# Patient Record
Sex: FEMALE | Race: WHITE
[De-identification: names, ages, dates, MRNs, and addresses within clinical notes are randomized per-mention and may not be internally consistent; named-entity substitution may affect disease eponyms.]

---

## 2017-07-15 ENCOUNTER — HEALTH MAINTENANCE LETTER (OUTPATIENT)
Age: 47
End: 2017-07-15

## 2017-09-09 ENCOUNTER — APPOINTMENT (OUTPATIENT)
Dept: CT IMAGING | Facility: CLINIC | Age: 47
DRG: 418 | End: 2017-09-09
Attending: EMERGENCY MEDICINE
Payer: COMMERCIAL

## 2017-09-09 ENCOUNTER — ANESTHESIA EVENT (OUTPATIENT)
Dept: SURGERY | Facility: CLINIC | Age: 47
DRG: 418 | End: 2017-09-09
Payer: COMMERCIAL

## 2017-09-09 ENCOUNTER — HOSPITAL ENCOUNTER (INPATIENT)
Facility: CLINIC | Age: 47
LOS: 1 days | Discharge: HOME | DRG: 418 | End: 2017-09-10
Attending: EMERGENCY MEDICINE | Admitting: INTERNAL MEDICINE
Payer: COMMERCIAL

## 2017-09-09 ENCOUNTER — APPOINTMENT (OUTPATIENT)
Dept: ULTRASOUND IMAGING | Facility: CLINIC | Age: 47
DRG: 418 | End: 2017-09-09
Attending: EMERGENCY MEDICINE
Payer: COMMERCIAL

## 2017-09-09 DIAGNOSIS — I10 BENIGN ESSENTIAL HYPERTENSION: Primary | ICD-10-CM

## 2017-09-09 DIAGNOSIS — K81.0 ACUTE CHOLECYSTITIS: ICD-10-CM

## 2017-09-09 PROBLEM — K81.9 CHOLECYSTITIS: Status: ACTIVE | Noted: 2017-09-09

## 2017-09-09 LAB
ALBUMIN SERPL-MCNC: 4.2 G/DL (ref 3.4–5)
ALP SERPL-CCNC: 127 U/L (ref 40–150)
ALT SERPL W P-5'-P-CCNC: 41 U/L (ref 0–50)
ANION GAP SERPL CALCULATED.3IONS-SCNC: 11 MMOL/L (ref 3–14)
AST SERPL W P-5'-P-CCNC: 25 U/L (ref 0–45)
BILIRUB SERPL-MCNC: 0.5 MG/DL (ref 0.2–1.3)
BUN SERPL-MCNC: 10 MG/DL (ref 7–30)
CALCIUM SERPL-MCNC: 9.5 MG/DL (ref 8.5–10.1)
CHLORIDE SERPL-SCNC: 106 MMOL/L (ref 94–109)
CO2 SERPL-SCNC: 23 MMOL/L (ref 20–32)
CREAT SERPL-MCNC: 0.8 MG/DL (ref 0.52–1.04)
ERYTHROCYTE [DISTWIDTH] IN BLOOD BY AUTOMATED COUNT: 13.2 % (ref 10–15)
GFR SERPL CREATININE-BSD FRML MDRD: 76 ML/MIN/1.7M2
GLUCOSE SERPL-MCNC: 113 MG/DL (ref 70–99)
HCT VFR BLD AUTO: 41.9 % (ref 35–47)
HGB BLD-MCNC: 14.4 G/DL (ref 11.7–15.7)
LIPASE SERPL-CCNC: 97 U/L (ref 73–393)
MCH RBC QN AUTO: 29.5 PG (ref 26.5–33)
MCHC RBC AUTO-ENTMCNC: 34.4 G/DL (ref 31.5–36.5)
MCV RBC AUTO: 86 FL (ref 78–100)
PLATELET # BLD AUTO: 170 10E9/L (ref 150–450)
POTASSIUM SERPL-SCNC: 3.8 MMOL/L (ref 3.4–5.3)
PROT SERPL-MCNC: 7.4 G/DL (ref 6.8–8.8)
RBC # BLD AUTO: 4.88 10E12/L (ref 3.8–5.2)
SODIUM SERPL-SCNC: 140 MMOL/L (ref 133–144)
WBC # BLD AUTO: 11.3 10E9/L (ref 4–11)

## 2017-09-09 PROCEDURE — 74177 CT ABD & PELVIS W/CONTRAST: CPT

## 2017-09-09 PROCEDURE — 25000128 H RX IP 250 OP 636: Performed by: EMERGENCY MEDICINE

## 2017-09-09 PROCEDURE — 25000132 ZZH RX MED GY IP 250 OP 250 PS 637: Performed by: PHYSICIAN ASSISTANT

## 2017-09-09 PROCEDURE — 96365 THER/PROPH/DIAG IV INF INIT: CPT | Mod: 59

## 2017-09-09 PROCEDURE — 12000007 ZZH R&B INTERMEDIATE

## 2017-09-09 PROCEDURE — S0028 INJECTION, FAMOTIDINE, 20 MG: HCPCS | Performed by: PHYSICIAN ASSISTANT

## 2017-09-09 PROCEDURE — 80053 COMPREHEN METABOLIC PANEL: CPT | Performed by: EMERGENCY MEDICINE

## 2017-09-09 PROCEDURE — 83690 ASSAY OF LIPASE: CPT | Performed by: EMERGENCY MEDICINE

## 2017-09-09 PROCEDURE — 25000128 H RX IP 250 OP 636: Performed by: PHYSICIAN ASSISTANT

## 2017-09-09 PROCEDURE — 85027 COMPLETE CBC AUTOMATED: CPT | Performed by: EMERGENCY MEDICINE

## 2017-09-09 PROCEDURE — 96376 TX/PRO/DX INJ SAME DRUG ADON: CPT

## 2017-09-09 PROCEDURE — 25000125 ZZHC RX 250: Performed by: EMERGENCY MEDICINE

## 2017-09-09 PROCEDURE — 99223 1ST HOSP IP/OBS HIGH 75: CPT | Mod: AI | Performed by: PHYSICIAN ASSISTANT

## 2017-09-09 PROCEDURE — 25000125 ZZHC RX 250: Performed by: PHYSICIAN ASSISTANT

## 2017-09-09 PROCEDURE — 96361 HYDRATE IV INFUSION ADD-ON: CPT

## 2017-09-09 PROCEDURE — 99221 1ST HOSP IP/OBS SF/LOW 40: CPT | Mod: 57 | Performed by: SURGERY

## 2017-09-09 PROCEDURE — 99285 EMERGENCY DEPT VISIT HI MDM: CPT | Mod: 25

## 2017-09-09 PROCEDURE — 76705 ECHO EXAM OF ABDOMEN: CPT

## 2017-09-09 PROCEDURE — 96375 TX/PRO/DX INJ NEW DRUG ADDON: CPT

## 2017-09-09 PROCEDURE — 25000132 ZZH RX MED GY IP 250 OP 250 PS 637: Performed by: EMERGENCY MEDICINE

## 2017-09-09 RX ORDER — ESTRADIOL 0.05 MG/D
1 PATCH, EXTENDED RELEASE TRANSDERMAL
COMMUNITY

## 2017-09-09 RX ORDER — NALOXONE HYDROCHLORIDE 0.4 MG/ML
.1-.4 INJECTION, SOLUTION INTRAMUSCULAR; INTRAVENOUS; SUBCUTANEOUS
Status: DISCONTINUED | OUTPATIENT
Start: 2017-09-09 | End: 2017-09-10 | Stop reason: HOSPADM

## 2017-09-09 RX ORDER — POTASSIUM CHLORIDE 7.45 MG/ML
10 INJECTION INTRAVENOUS
Status: DISCONTINUED | OUTPATIENT
Start: 2017-09-09 | End: 2017-09-10 | Stop reason: HOSPADM

## 2017-09-09 RX ORDER — OXYCODONE HYDROCHLORIDE 5 MG/1
5 TABLET ORAL EVERY 4 HOURS PRN
Status: DISCONTINUED | OUTPATIENT
Start: 2017-09-09 | End: 2017-09-10

## 2017-09-09 RX ORDER — AMOXICILLIN 250 MG
1-2 CAPSULE ORAL 2 TIMES DAILY PRN
Status: DISCONTINUED | OUTPATIENT
Start: 2017-09-09 | End: 2017-09-10 | Stop reason: HOSPADM

## 2017-09-09 RX ORDER — BISACODYL 10 MG
10 SUPPOSITORY, RECTAL RECTAL DAILY PRN
Status: DISCONTINUED | OUTPATIENT
Start: 2017-09-09 | End: 2017-09-10 | Stop reason: HOSPADM

## 2017-09-09 RX ORDER — LORAZEPAM 0.5 MG/1
0.5 TABLET ORAL DAILY PRN
Status: DISCONTINUED | OUTPATIENT
Start: 2017-09-09 | End: 2017-09-10 | Stop reason: HOSPADM

## 2017-09-09 RX ORDER — LANOLIN ALCOHOL/MO/W.PET/CERES
3 CREAM (GRAM) TOPICAL
Status: DISCONTINUED | OUTPATIENT
Start: 2017-09-09 | End: 2017-09-10 | Stop reason: HOSPADM

## 2017-09-09 RX ORDER — POTASSIUM CHLORIDE 1500 MG/1
20-40 TABLET, EXTENDED RELEASE ORAL
Status: DISCONTINUED | OUTPATIENT
Start: 2017-09-09 | End: 2017-09-10 | Stop reason: HOSPADM

## 2017-09-09 RX ORDER — ERTAPENEM 1 G/1
1 INJECTION, POWDER, LYOPHILIZED, FOR SOLUTION INTRAMUSCULAR; INTRAVENOUS ONCE
Status: COMPLETED | OUTPATIENT
Start: 2017-09-09 | End: 2017-09-09

## 2017-09-09 RX ORDER — ONDANSETRON 2 MG/ML
4 INJECTION INTRAMUSCULAR; INTRAVENOUS EVERY 6 HOURS PRN
Status: DISCONTINUED | OUTPATIENT
Start: 2017-09-09 | End: 2017-09-10 | Stop reason: HOSPADM

## 2017-09-09 RX ORDER — HYDRALAZINE HYDROCHLORIDE 20 MG/ML
10 INJECTION INTRAMUSCULAR; INTRAVENOUS EVERY 4 HOURS PRN
Status: DISCONTINUED | OUTPATIENT
Start: 2017-09-09 | End: 2017-09-10 | Stop reason: HOSPADM

## 2017-09-09 RX ORDER — HYDROMORPHONE HYDROCHLORIDE 1 MG/ML
0.5 INJECTION, SOLUTION INTRAMUSCULAR; INTRAVENOUS; SUBCUTANEOUS
Status: DISCONTINUED | OUTPATIENT
Start: 2017-09-09 | End: 2017-09-09

## 2017-09-09 RX ORDER — BUPROPION HYDROCHLORIDE 200 MG/1
200 TABLET, EXTENDED RELEASE ORAL DAILY
Status: DISCONTINUED | OUTPATIENT
Start: 2017-09-09 | End: 2017-09-10 | Stop reason: HOSPADM

## 2017-09-09 RX ORDER — IOPAMIDOL 755 MG/ML
72 INJECTION, SOLUTION INTRAVASCULAR ONCE
Status: COMPLETED | OUTPATIENT
Start: 2017-09-09 | End: 2017-09-09

## 2017-09-09 RX ORDER — SODIUM CHLORIDE 9 MG/ML
INJECTION, SOLUTION INTRAVENOUS CONTINUOUS
Status: DISCONTINUED | OUTPATIENT
Start: 2017-09-09 | End: 2017-09-10

## 2017-09-09 RX ORDER — PROCHLORPERAZINE MALEATE 5 MG
5-10 TABLET ORAL EVERY 6 HOURS PRN
Status: DISCONTINUED | OUTPATIENT
Start: 2017-09-09 | End: 2017-09-10 | Stop reason: HOSPADM

## 2017-09-09 RX ORDER — HYDROMORPHONE HYDROCHLORIDE 1 MG/ML
.3-.5 INJECTION, SOLUTION INTRAMUSCULAR; INTRAVENOUS; SUBCUTANEOUS
Status: DISCONTINUED | OUTPATIENT
Start: 2017-09-09 | End: 2017-09-10

## 2017-09-09 RX ORDER — HYDROCODONE BITARTRATE AND ACETAMINOPHEN 5; 325 MG/1; MG/1
1-2 TABLET ORAL EVERY 4 HOURS PRN
Status: DISCONTINUED | OUTPATIENT
Start: 2017-09-09 | End: 2017-09-10

## 2017-09-09 RX ORDER — POTASSIUM CHLORIDE 1.5 G/1.58G
20-40 POWDER, FOR SOLUTION ORAL
Status: DISCONTINUED | OUTPATIENT
Start: 2017-09-09 | End: 2017-09-10 | Stop reason: HOSPADM

## 2017-09-09 RX ORDER — ONDANSETRON 4 MG/1
4 TABLET, ORALLY DISINTEGRATING ORAL EVERY 6 HOURS PRN
Status: DISCONTINUED | OUTPATIENT
Start: 2017-09-09 | End: 2017-09-10 | Stop reason: HOSPADM

## 2017-09-09 RX ORDER — ACETAMINOPHEN 325 MG/1
650 TABLET ORAL EVERY 4 HOURS PRN
Status: DISCONTINUED | OUTPATIENT
Start: 2017-09-09 | End: 2017-09-10 | Stop reason: HOSPADM

## 2017-09-09 RX ORDER — LIDOCAINE 40 MG/G
CREAM TOPICAL
Status: DISCONTINUED | OUTPATIENT
Start: 2017-09-09 | End: 2017-09-10 | Stop reason: HOSPADM

## 2017-09-09 RX ORDER — POTASSIUM CHLORIDE 29.8 MG/ML
20 INJECTION INTRAVENOUS
Status: DISCONTINUED | OUTPATIENT
Start: 2017-09-09 | End: 2017-09-10 | Stop reason: HOSPADM

## 2017-09-09 RX ORDER — ONDANSETRON 2 MG/ML
4 INJECTION INTRAMUSCULAR; INTRAVENOUS EVERY 30 MIN PRN
Status: DISCONTINUED | OUTPATIENT
Start: 2017-09-09 | End: 2017-09-09

## 2017-09-09 RX ORDER — ERTAPENEM 1 G/1
1 INJECTION, POWDER, LYOPHILIZED, FOR SOLUTION INTRAMUSCULAR; INTRAVENOUS EVERY 24 HOURS
Status: DISCONTINUED | OUTPATIENT
Start: 2017-09-10 | End: 2017-09-10

## 2017-09-09 RX ORDER — POLYETHYLENE GLYCOL 3350 17 G/17G
17 POWDER, FOR SOLUTION ORAL DAILY PRN
Status: DISCONTINUED | OUTPATIENT
Start: 2017-09-09 | End: 2017-09-10 | Stop reason: HOSPADM

## 2017-09-09 RX ORDER — PROCHLORPERAZINE 25 MG
25 SUPPOSITORY, RECTAL RECTAL EVERY 12 HOURS PRN
Status: DISCONTINUED | OUTPATIENT
Start: 2017-09-09 | End: 2017-09-10 | Stop reason: HOSPADM

## 2017-09-09 RX ORDER — LIDOCAINE 40 MG/G
CREAM TOPICAL
Status: DISCONTINUED | OUTPATIENT
Start: 2017-09-09 | End: 2017-09-09

## 2017-09-09 RX ORDER — POTASSIUM CL/LIDO/0.9 % NACL 10MEQ/0.1L
10 INTRAVENOUS SOLUTION, PIGGYBACK (ML) INTRAVENOUS
Status: DISCONTINUED | OUTPATIENT
Start: 2017-09-09 | End: 2017-09-10 | Stop reason: HOSPADM

## 2017-09-09 RX ORDER — SERTRALINE HYDROCHLORIDE 100 MG/1
200 TABLET, FILM COATED ORAL DAILY
Status: DISCONTINUED | OUTPATIENT
Start: 2017-09-09 | End: 2017-09-10 | Stop reason: HOSPADM

## 2017-09-09 RX ORDER — SODIUM CHLORIDE 9 MG/ML
1000 INJECTION, SOLUTION INTRAVENOUS CONTINUOUS
Status: DISCONTINUED | OUTPATIENT
Start: 2017-09-09 | End: 2017-09-09

## 2017-09-09 RX ADMIN — IOPAMIDOL 72 ML: 755 INJECTION, SOLUTION INTRAVENOUS at 12:44

## 2017-09-09 RX ADMIN — BUPROPION HYDROCHLORIDE 200 MG: 200 TABLET, EXTENDED RELEASE ORAL at 19:58

## 2017-09-09 RX ADMIN — HYDROMORPHONE HYDROCHLORIDE 0.5 MG: 1 INJECTION, SOLUTION INTRAMUSCULAR; INTRAVENOUS; SUBCUTANEOUS at 11:05

## 2017-09-09 RX ADMIN — HYDROMORPHONE HYDROCHLORIDE 0.5 MG: 1 INJECTION, SOLUTION INTRAMUSCULAR; INTRAVENOUS; SUBCUTANEOUS at 19:25

## 2017-09-09 RX ADMIN — LIDOCAINE HYDROCHLORIDE 30 ML: 20 SOLUTION ORAL; TOPICAL at 11:26

## 2017-09-09 RX ADMIN — ERTAPENEM SODIUM 1 G: 1 INJECTION, POWDER, LYOPHILIZED, FOR SOLUTION INTRAMUSCULAR; INTRAVENOUS at 14:46

## 2017-09-09 RX ADMIN — SODIUM CHLORIDE 62 ML: 9 INJECTION, SOLUTION INTRAVENOUS at 12:45

## 2017-09-09 RX ADMIN — ONDANSETRON 4 MG: 2 SOLUTION INTRAMUSCULAR; INTRAVENOUS at 11:02

## 2017-09-09 RX ADMIN — SERTRALINE HYDROCHLORIDE 200 MG: 100 TABLET ORAL at 17:14

## 2017-09-09 RX ADMIN — SODIUM CHLORIDE 1000 ML: 9 INJECTION, SOLUTION INTRAVENOUS at 10:55

## 2017-09-09 RX ADMIN — HYDROMORPHONE HYDROCHLORIDE 0.5 MG: 1 INJECTION, SOLUTION INTRAMUSCULAR; INTRAVENOUS; SUBCUTANEOUS at 12:29

## 2017-09-09 RX ADMIN — FAMOTIDINE 20 MG: 10 INJECTION, SOLUTION INTRAVENOUS at 21:44

## 2017-09-09 ASSESSMENT — ENCOUNTER SYMPTOMS
NAUSEA: 1
ABDOMINAL PAIN: 1
VOMITING: 1
BACK PAIN: 0
DIARRHEA: 0
FEVER: 0

## 2017-09-09 NOTE — ED NOTES
[facility]  ED Nurse Handoff Report    ED Chief complaint: Abdominal Pain (Upper abd pain started at 0130 today. )      ED Diagnosis:   Final diagnoses:   Acute cholecystitis       Code Status: Full Code    Allergies:   Allergies   Allergen Reactions     Penicillins        Activity level - Baseline/Home:  Independent     Activity Level - Current:   Independent     Needed?: No    Isolation: No  Infection: Not Applicable    Bariatric?: No    Vital Signs:   Vitals:    09/09/17 1200 09/09/17 1215 09/09/17 1230 09/09/17 1231   BP: (!) 155/96  (!) 146/92 (!) 146/92   Resp:    16   Temp:       TempSrc:       SpO2: 97% 97% 98% 98%       Cardiac Rhythm: ,        Pain level: 0-10 Pain Scale: 6    Is this patient confused?: No    Patient Report: Initial Complaint: Pt presents to the ED with c/o uppper abd pain. Sudden onset at 0130 today and describes it as a stabbing pain.    Focused Assessment: Pt c/o N/V with abd pain.    Tests Performed:   Results for orders placed or performed during the hospital encounter of 09/09/17 (from the past 24 hour(s))   Comprehensive metabolic panel   Result Value Ref Range    Sodium 140 133 - 144 mmol/L    Potassium 3.8 3.4 - 5.3 mmol/L    Chloride 106 94 - 109 mmol/L    Carbon Dioxide 23 20 - 32 mmol/L    Anion Gap 11 3 - 14 mmol/L    Glucose 113 (H) 70 - 99 mg/dL    Urea Nitrogen 10 7 - 30 mg/dL    Creatinine 0.80 0.52 - 1.04 mg/dL    GFR Estimate 76 >60 mL/min/1.7m2    GFR Estimate If Black >90 >60 mL/min/1.7m2    Calcium 9.5 8.5 - 10.1 mg/dL    Bilirubin Total 0.5 0.2 - 1.3 mg/dL    Albumin 4.2 3.4 - 5.0 g/dL    Protein Total 7.4 6.8 - 8.8 g/dL    Alkaline Phosphatase 127 40 - 150 U/L    ALT 41 0 - 50 U/L    AST 25 0 - 45 U/L   Lipase   Result Value Ref Range    Lipase 97 73 - 393 U/L   CBC with platelets   Result Value Ref Range    WBC 11.3 (H) 4.0 - 11.0 10e9/L    RBC Count 4.88 3.8 - 5.2 10e12/L    Hemoglobin 14.4 11.7 - 15.7 g/dL    Hematocrit 41.9 35.0 -  47.0 %    MCV 86 78 - 100 fl    MCH 29.5 26.5 - 33.0 pg    MCHC 34.4 31.5 - 36.5 g/dL    RDW 13.2 10.0 - 15.0 %    Platelet Count 170 150 - 450 10e9/L   CT Abdomen Pelvis w Contrast    Narrative    CT ABDOMEN/PELVIS WITH CONTRAST September 9, 2017 12:48 PM     HISTORY: Epigastric abdomen pain, elevated white blood cell count.    TECHNIQUE: 72 mL Isovue-370. Radiation dose for this scan was reduced  using automated exposure control, adjustment of the mA and/or kV  according to patient size, or iterative reconstruction technique.    COMPARISON: None.    FINDINGS: No evidence of diverticulitis or small bowel obstruction.  Unremarkable appendix. There is gallbladder wall thickening and mild  distention, suspicious for cholecystitis. Mild fatty liver  infiltration. On image 8, there is a left lung base nodule measuring  3.5 mm.     Recommendations for one or multiple incidental lung nodules < 6mm :    Low risk patients: No routine follow-up.    High risk patients: Optional follow-up CT at 12 months; if  unchanged, no further follow-up.    *Low Risk: Minimal or absent history of smoking or other known risk  factors.  *Nonsolid (ground glass) or partly solid nodules may require longer  follow-up to exclude indolent adenocarcinoma.  *Recommendations based on Guidelines for the Management of Incidental  Pulmonary Nodules Detected at CT: From the Fleischner Society 2017,  Radiology 2017.   Nothing else acute is seen in the upper abdominal organs.       Impression    IMPRESSION:  1. Suspected cholecystitis.  2. Small lung nodule, as above.   3. Mild fatty liver.   Abdomen US, limited (RUQ only)    Narrative    ULTRASOUND ABDOMEN LIMITED September 9, 2017 2:16 PM     HISTORY: Cholecystitis on CT? Pain, vomiting.    FINDINGS: Mild fatty liver infiltration. There are multiple  gallstones. There is wall thickening (6.7 mm). There is mild  gallbladder distention. The patient was tender while scanning over the  gallbladder. No focal  hepatic lesion or common bile duct dilatation.  The portions of the pancreas visualized are unremarkable. The right  kidney is unremarkable.        Impression    IMPRESSION:   1. Findings suggesting cholecystitis. Clinical correlation  recommended.  2. Mild fatty liver.     Treatments provided: pain mgt    Family Comments: at bedside.    OBS brochure/video discussed/provided to patient: No    ED Medications:   Medications   lidocaine 1 % 1 mL (not administered)   lidocaine (LMX4) cream ( Topical Not Given 9/9/17 1052)   sodium chloride (PF) 0.9% PF flush 3 mL (not administered)   sodium chloride (PF) 0.9% PF flush 3 mL (not administered)   0.9% sodium chloride BOLUS (0 mLs Intravenous Stopped 9/9/17 1156)     Followed by   0.9% sodium chloride infusion (not administered)   ondansetron (ZOFRAN) injection 4 mg (4 mg Intravenous Given 9/9/17 1102)   HYDROmorphone (PF) (DILAUDID) injection 0.5 mg (0.5 mg Intravenous Given 9/9/17 1229)   ertapenem (INVanz) 1 g vial to attach to  mL bag (not administered)   lidocaine (viscous) (XYLOCAINE) 2 % 15 mL, alum & mag hydroxide-simethicone (MYLANTA ES/MAALOX  ES) 15 mL GI Cocktail (30 mLs Oral Given 9/9/17 1126)   iopamidol (ISOVUE-370) solution 72 mL (72 mLs Intravenous Given 9/9/17 1244)   Saline flush (62 mLs Intravenous Given 9/9/17 1245)       Drips infusing?:  Yes      ED NURSE PHONE NUMBER: [phone]

## 2017-09-09 NOTE — H&P
PRIMARY CARE PHYSICIAN:  The patient is seen at [facility] in [place].      CHIEF COMPLAINT:  Abdominal pain.      HISTORY OF PRESENT ILLNESS:  Gabbi Grady is a 46-year-old female with a past medical history significant for major depressive disorder, essential hypertension, migraines and menopausal state who presented to [facility] Emergency Department on 09/09/2017 due to abdominal pain.      The patient was originally seen at urgent care and was sent to [facility] Emergency Department due to persistent abdominal pain.  The patient indicated that her pain began earlier this morning around 1:00, rated it a 9/10 with associated nausea and vomiting.  The patient was unable to keep any food or liquids down and could not get into a comfortable position.  She described the pain as sharp and primarily in the epigastric area in the right upper quadrant without radiation.  The patient indicated she never experienced anything like this before.      The patient was evaluated in the Emergency Department by Dr. Juarez.  Evaluation included a comprehensive metabolic panel that revealed a creatinine of 0.8 with a GFR of 76, glucose level 113, otherwise within normal limits.  Lipase level was within normal limits at 97.  CBC revealed a white count of 11.3, hemoglobin of 14.4 and platelet count of 170.  The patient underwent a right upper quadrant limited abdominal ultrasound that revealed findings suggestive of cholecystitis and mild fatty liver.  Abdomen and pelvis CT was performed with contrast that showed suspected cholecystitis, a small lung nodule and mild fatty liver, no gallstones were indicated.  The patient was given IV fluids with a full 1 liter being administered.  She also received 1 gram of IV ertapenem, a total of 1 mg of IV Dilaudid, 4 mg of IV Zofran and was then admitted to [facility] under the Hospitalist Service for continued evaluation and management.  I  evaluated the patient in the Emergency Department with her  present at bedside.  The patient again endorsed having abdominal pain that began way early in the morning which she never experienced before, sharp in nature in the epigastric and right upper quadrant area with associated nausea and vomiting.  The patient indicated that she frequently gets headaches and migraines which are sometimes accompanied with an aura.  This last occurred on Tuesday.  She also endorses bruising quite easily.  Otherwise, she is denying lightheadedness, dizziness, chest pain, palpitations, shortness of breath or dysuria.      PAST MEDICAL HISTORY:   1.  Major depressive disorder.   2.  Essential hypertension.   3.  Headaches/migraines.   4.  Menopausal state.      PAST SURGICAL HISTORY:   1.  Tonsillectomy.   2.   x3.   3.  Total laparoscopic hysterectomy with bilateral salpingo-oophorectomy.   4.  Laparoscopy x2.   5.  LEEP procedure.   6.  Cerclage x2.       PRIOR TO ADMISSION MEDICATIONS:    Prior to Admission medications    Medication Sig Last Dose Taking? Auth Provider   FLUOXETINE HCL PO Take 60 mg by mouth daily Takes every other month, alternating with Sertraline 2017 Yes Unknown, Entered By History   BuPROPion HCl (WELLBUTRIN SR PO) Take 200 mg by mouth daily 2017 at am Yes Unknown, Entered By History   aspirin-acetaminophen-caffeine (EXCEDRIN MIGRAINE) 250-250-65 MG per tablet Take 1-2 tablets by mouth every 6 hours as needed for headaches 2017 Yes Unknown, Entered By History   LORAZEPAM PO Take 0.5 mg by mouth daily as needed for anxiety 2017 Yes Unknown, Entered By History   progesterone (PROMETRIUM) 200 MG capsule Take 200 mg by mouth At Bedtime 2017 at Unknown time Yes Unknown, Entered By History   SERTRALINE HCL PO Take 200 mg by mouth daily Alternates Sertraline with Fluoxetine every other month 2017 at am Yes Unknown, Entered By History   HYDROCHLOROTHIAZIDE PO Take 25 mg by  mouth daily 9/8/2017 at Unknown time Yes Unknown, Entered By History   ZOLPIDEM TARTRATE PO Take 10 mg by mouth nightly as needed for sleep Past Week at Unknown time Yes Unknown, Entered By History   estradiol (VIVELLE-DOT) 0.05 MG/24HR BIW patch Place 1 patch onto the skin twice a week Sun/Thurs 9/7/2017 at Unknown time Yes Unknown, Entered By History           ALLERGIES:  Penicillin.      SOCIAL HISTORY:  The patient currently resides in a house in [place] with her  and 4 daughters.  She is a lifelong nontobacco user.  She endorses every other week alcohol consumption with 1-2 beverages at a time.  She denies street or illicit drug use and does not currently utilize a cane or walker.      FAMILY MEDICAL HISTORY:   1.  Father is alive but does have heart disease with noted previous heart attack and cerebrovascular accident.   2.  Mother is alive and has had ovarian cancer, renal cancer, lymphoma and thyroid disease.      REVIEW OF SYSTEMS:  A 10-point review of systems was performed.  All pertinent positives are listed in the History of Present Illness, otherwise is negative.      PHYSICAL EXAMINATION:   VITAL SIGNS:  Temperature is 98.7 degrees Fahrenheit with a blood pressure 125/77, heart rate of 87 beats per minute, respiratory rate of 18, O2 saturation of 98% on room air.  The patient is currently rating her pain a 3/10 for my assessment.    GENERAL:  The patient is awake, alert and cooperative, in no apparent distress, alert and oriented x3.   HEENT:  Normocephalic, atraumatic.  Moist mucous membranes present.  No exudates noted in the posterior pharynx.  Uvula is midline.  Eyes:  Pupils are equal, round, reactive to light.  Extraocular movements are intact.  Normal sclerae.   NECK:  Supple, normal range of motion, no tracheal deviation, no cervical lymphadenopathy present.   CARDIOVASCULAR:  Regular rate and rhythm, no rubs, murmurs or gallops appreciated.   PULMONARY:  Lungs are clear to  auscultation bilaterally, no wheezes, rhonchi or rales appreciated.   GASTROINTESTINAL:  Bowel sounds are present in all 4 quadrants, soft, nondistended.  The patient is tender to palpation in the epigastric and right upper quadrant.   NEUROLOGIC:  Cranial nerves II-XII are grossly intact.  The patient demonstrates equal sensation, coordination and strength in the upper and lower extremities bilaterally.   EXTREMITIES:  No lower extremity edema noted bilaterally.  Calves are nontender to palpation and dorsal pedal pulses are palpable bilaterally.      ASSESSMENT AND PLAN:  Gabbi Grady is a 46-year-old female with a past medical history significant for major depressive disorder, essential hypertension, headaches/migraines and menopausal state who is being admitted to [facility] on 09/09/2017 due to acute cholecystitis.   1.  Acute cholecystitis:  This was confirmed on right upper quadrant limited ultrasound and abdomen and pelvis CT.  The patient has a noted mild leukocytosis of 11.3.  The patient's abdominal pain came on abruptly, sharp in nature with associated nausea and vomiting.  At this time, patient will be made nothing by mouth.  Have formally consulted General Surgery.  The patient has received intravenous Invanz and will continue with this antibiotic on the floor.  We will continue with analgesic management including intravenous Dilaudid, oral Norco, oral Tylenol, all as needed.   2.  Essential hypertension:  The patient's blood pressures appear to be stable at this point.  The patient is compliant with prior to admission hydrochlorothiazide at 25 mg daily.  We will hold this medication while patient is nothing by mouth and initiate intravenous fluids with normal saline at 100 mL an hour.  Will have intravenous hydralazine made available as needed for systolic blood pressures greater than 180.   3.  Major depressive disorder:  This appears to be well compensated with patient's prior  to admission regimen including Wellbutrin 200 mg daily, fluoxetine 50 mg daily, sertraline 200 mg daily (patient alternates sertraline and fluoxetine every other month) and as needed Ativan 0.5 mg daily.  Will continue prior to admission sertraline, Wellbutrin and as needed Ativan.   4.  Headache/migraines:  Patient's last occurrence was last Tuesday.  We will have Tylenol made available as needed as well as further analgesics as stated above.   5.  Insomnia:  Patient currently utilizes zolpidem 10 mg nightly as needed.  Will continue this medication at this point.   6.  Menopausal state:  Patient is status post laparoscopic total hysterectomy with bilateral salpingo-oophorectomy.  She currently utilizes an Estradiol patch twice a week on Sundays and  and a nightly progesterone 200 mg capsule.  Will hold Estradiol patch and continue progesterone 200 mg nightly.   7.  Deep venous thrombosis prophylaxis:  Will place the patient on pneumatic compression devices.      CODE STATUS:  Discussed with the patient and her  and they elected to be full code.      DISPOSITION:  The patient is being admitted under inpatient status due to acute cholecystitis requiring IV antibiotics and a General Surgery consult.  I believe the patient will be hospitalized for a minimum of 2 evenings while undergoing continued antibiotics and likely surgical intervention.      The patient was discussed with Dr. Cross, who agrees with the assessment and plan as stated above.  Dr. Cross will evaluate the patient independently.         LARY CROSS MD       As dictated by ZULEYMA ANGEL PA-C            D: 2017 15:22   T: 2017 16:19   MT: LAURA      Name:     HANNAH GIPSON   MRN:      [id number]        Account:      [id number]   :      1970           Admitted:     [id number]      Document: [id number]       cc: [facility]

## 2017-09-09 NOTE — PHARMACY-ADMISSION MEDICATION HISTORY
Admission medication history interview status for the 9/9/2017  admission is complete. See EPIC admission navigator for prior to admission medications     Medication history source reliability:Good    Actions taken by pharmacist (provider contacted, etc):None     Additional medication history information not noted on PTA med list :None    Medication reconciliation/reorder completed by provider prior to medication history? No    Time spent in this activity: 20 min    Prior to Admission medications    Medication Sig Last Dose Taking? Auth Provider   FLUOXETINE HCL PO Take 60 mg by mouth daily Takes every other month, alternating with Sertraline 8/31/2017 Yes Unknown, Entered By History   BuPROPion HCl (WELLBUTRIN SR PO) Take 200 mg by mouth daily 9/8/2017 at am Yes Unknown, Entered By History   aspirin-acetaminophen-caffeine (EXCEDRIN MIGRAINE) 250-250-65 MG per tablet Take 1-2 tablets by mouth every 6 hours as needed for headaches 9/5/2017 Yes Unknown, Entered By History   LORAZEPAM PO Take 0.5 mg by mouth daily as needed for anxiety 8/26/2017 Yes Unknown, Entered By History   progesterone (PROMETRIUM) 200 MG capsule Take 200 mg by mouth At Bedtime 9/8/2017 at Unknown time Yes Unknown, Entered By History   SERTRALINE HCL PO Take 200 mg by mouth daily Alternates Sertraline with Fluoxetine every other month 9/8/2017 at am Yes Unknown, Entered By History   HYDROCHLOROTHIAZIDE PO Take 25 mg by mouth daily 9/8/2017 at Unknown time Yes Unknown, Entered By History   ZOLPIDEM TARTRATE PO Take 10 mg by mouth nightly as needed for sleep Past Week at Unknown time Yes Unknown, Entered By History   estradiol (VIVELLE-DOT) 0.05 MG/24HR BIW patch Place 1 patch onto the skin twice a week Sun/Thurs 9/7/2017 at Unknown time Yes Unknown, Entered By History

## 2017-09-09 NOTE — ED PROVIDER NOTES
History     Chief Complaint:  Abdominal Pain     HPI   Gabbi Grady is a 46 year old female who presents to the emergency department today from urgent care for evaluation of abdominal pain. The patient reports a sudden onset of stabbing epigastric abdominal pain around 0130 today. The patient states the pain is a 9/10, and was accompanied by nausea and multiple episodes of non-bloody emesis. The pain is severe enough that she is not comfortable in any position. The patient denies any alcohol use last night, or in the last few days. She has no history of pancreatitis or ulcers. The patient denies any radiation of the pain to her back, or any fever or diarrhea.     Allergies:  Penicillins      Medications:    EFFEXOR  MG OR CP24  AXERT 12.5 MG OR TABS  ZYRTEC 10 MG OR TABS    Past Medical History:    Depressive disorder   Headache   Hypertension   Menorrhagia   Urticaria    Past Surgical History:    Laparoscopy x2   section x3  Hysterectomy surgical with endometrial ablation   Trachelorrhaphy, plastic repair uterine cervix     Family History:    History reviewed. No pertinent family history.      Social History:  The patient was accompanied to the ED by her  Ric.  Smoking Status: Never smoker  Smokeless Tobacco: Never used   Alcohol Use: Yes    Marital Status:        Review of Systems   Constitutional: Negative for fever.   Gastrointestinal: Positive for abdominal pain (epigastric ), nausea and vomiting. Negative for diarrhea.   Musculoskeletal: Negative for back pain.   All other systems reviewed and are negative.    Physical Exam     Patient Vitals for the past 24 hrs:   BP Temp Temp src Heart Rate Resp SpO2   17 1231 (!) 146/92 - - 88 16 98 %   17 1230 (!) 146/92 - - - - 98 %   17 1215 - - - - - 97 %   17 1200 (!) 155/96 - - - - 97 %   17 1155 - - - - - 97 %   17 1130 (!) 155/94 - - - - 93 %   17 1126 - - - - 18 -   17 1029 (!)  153/108 98.7  F (37.1  C) Oral 90 18 100 %        Physical Exam  Nursing note and vitals reviewed.  Constitutional:  Appears well-developed and well-nourished, comfortable.   HENT:   Head:   No evidence of facial or scalp trauma.  Nose:    Nose normal.   Mouth/Throat:  Mucosa is moist.  Eyes:    Conjunctivae are normal.      Pupils are equal, round, and reactive to light.      Right eye exhibits no discharge. Left eye exhibits no discharge.      No scleral icterus.   Cardiovascular:  Normal rate, regular rhythm.      Normal heart sounds and intact distal pulses.       No murmur heard.  Pulmonary/Chest:  Effort normal and breath sounds normal. No respiratory distress.     No wheezes. No rales. No chest wall tenderness. No stridor.   Abdominal:   Soft. No distension and no mass       No flank pain.     Epigastric tenderness with palpation and worse with deep breathing. Guarding but no rebound. Negative Beavers's sign.   Musculoskeletal:  Normal range of motion.      No edema and no tenderness.                                       Neck supple, no midline cervical tenderness.   Neurological:   Alert and oriented to person, place, and year.      No cranial nerve deficit.      Exhibits normal muscle tone. Coordination normal.      GCS eye subscore is 4. GCS verbal subscore is 5.      GCS motor subscore is 6.   Skin:    Skin is warm and dry. No rash noted. No diaphoresis.      No erythema. No pallor.   Psychiatric:   Behavior is normal. Judgment and thought content normal.    Emergency Department Course     Imaging:  Radiology findings were communicated with the patient, family and Admitting MD who voiced understanding of the findings.    CT Abdomen/Pelvis w Contrast:  IMPRESSION:  1. Suspected cholecystitis.  2. Small lung nodule, as above.   Report per radiology       Abdomen US RUQ only:  IMPRESSION:   1. Findings suggesting cholecystitis. Clinical correlation  recommended.  2. Mild fatty liver.  Report per radiology       Laboratory:  Laboratory findings were communicated with the patient, family and Admitting MD who voiced understanding of the findings.    CBC: WBC 11.3 (H), HGB 14.4,    CMP: Glucose 113 (H),  o/w WNL. (Creatinine 0.80)   Lipase: 97     Interventions:  1055 NS Bolus 1,000mL IV   1102 Zofran 4mg IV   1105 Dilaudid 0.5mg IV    1126 GI Cocktail 30mg PO   1229 Dilaudid 0.5mg IV     1446 Invanz 1 g IV      Emergency Department Course:  Nursing notes and vitals reviewed.  1042 I performed an exam of the patient as documented above.   1223 Patient rechecked and updated on laboratory results and need for CT imaging   1313 Patient rechecked and updated on CT scan results.   1433 Patient rechecked and updated on ultrasound findings and need for admission.   1437 I spoke with Dr. Cross of the Hospitalist service regarding patient's presentation, findings, and plan of care.   The patient was sent for a CT abdomen/pelvis while here in the emergency department, findings above.   IV was inserted and blood was drawn for laboratory testing, results above.   The patient was sent for a Abdomen US RUQ only while in the emergency department, results above.    I discussed the treatment plan with the patient. They expressed understanding of this plan and consented to admission. I discussed the patient with Dr. Cross, who will admit the patient to a monitored bed for further evaluation and treatment. I personally reviewed the laboratory and imaging results with the Patient and spouse and answered all related questions prior to admission.   Impression & Plan      Medical Decision Making:  Patient comes in with epigastric and right upper abdominal pain. No fevers. Lab work came back showing an elevated white count of 11.3 but normal comprehensive panel and lipase. CT scan suggested acute cholecystitis. Ultrasound confirms that with a thickened gallbladder wall. No evidence of stones or obstruction. Invanz 1 g IV is ordered, I  have helped reduce her pain with Dilaudid and Zofran for nausea. Dr. Cross will be admitting the patient and she will get general surgery consultation.    Diagnosis:    ICD-10-CM    1. Acute cholecystitis K81.0      Disposition:  Admitted to third floor under the supervision of Dr. Cross. General surgery consultation.    Fredy Hoover  9/9/2017    EMERGENCY DEPARTMENT  Scribe Disclosure:  I, Fredy Hoover, am serving as a scribe at 10:42 AM on 9/9/2017 to document services personally performed by Nicky Juarez MD based on my observations and the provider's statements to me.       Nicky Juarez MD  09/09/17 1521

## 2017-09-09 NOTE — IP AVS SNAPSHOT
[address] [facility]    [address] [place] [address]    Phone:  [phone]                                       After Visit Summary   9/9/2017    Gabbi Grady    MRN: [id number]           After Visit Summary Signature Page     I have received my discharge instructions, and my questions have been answered. I have discussed any challenges I see with this plan with the nurse or doctor.    ..........................................................................................................................................  Patient/Patient Representative Signature      ..........................................................................................................................................  Patient Representative Print Name and Relationship to Patient    ..................................................               ................................................  Date                                            Time    ..........................................................................................................................................  Reviewed by Signature/Title    ...................................................              ..............................................  Date                                                            Time

## 2017-09-09 NOTE — IP AVS SNAPSHOT
MRN:[id number]                      After Visit Summary   9/9/2017    Gabbi Grady    MRN: [id number]           Thank you!     Thank you for choosing [facility] for your care. Our goal is always to provide you with excellent care. Hearing back from our patients is one way we can continue to improve our services. Please take a few minutes to complete the written survey that you may receive in the mail after you visit with us. Thank you!        Patient Information     Date Of Birth          1970        Designated Caregiver       Most Recent Value    Caregiver    Will someone help with your care after discharge? yes    Name of designated caregiver Ric    Phone number of caregiver [phone]    Caregiver address [address] [facility] [address] [facility]      About your hospital stay     You were admitted on:  September 9, 2017 You last received care in the:  [address] [facility]    You were discharged on:  September 10, 2017        Reason for your hospital stay       Laparoscopic cholecystectomy with Dr. Francis Chapa                  Who to Call     For medical emergencies, please call 911.  For non-urgent questions about your medical care, please call your primary care provider or clinic, [phone]  For questions related to your surgery, please call your surgery clinic        Attending Provider     Provider Specialty    Nicyk Juarez MD Emergency Medicine    [facility]       Primary Care Provider Office Phone # Fax #    [facility] [place] [facility] [phone]      After Care Instructions     Activity       Avoid strenuous activity or heavy lifting >20 pounds for 2-3 weeks.            Activity       Your activity upon discharge: activity as tolerated.  OK to shower.            Diet       Gradually resume your regular diet as tolerated.            Diet       Follow this diet upon discharge: Orders Placed This Encounter       Advance Diet as Tolerated: Clear Liquid Diet; Full Liquid Diet      Diet  Then diet as tolerated            Discharge Instructions       CONSTIPATION PREVENTION  We recommend that you go to a pharmacy and purchase ONE of the following stool softeners/laxatives and start taking today to prevent constipation. You can stop this bowel regimen once you are no longer taking your narcotic pain medication or are having regular bowel movements:    - Senokot oral once daily  - Milk of magnesium once daily  - Docusate Sodium 1 pill twice daily (stool softener)  - Miralax 1 scoop/packet 1-2 times daily (laxative)    In addition to the above options, if constipation continues, you can add:   - 4 oz Prune juice or Plum juice daily for constipation            Wound care and dressings       Keep dressings clean and dry. Remove the bandaids on post-op day #1 (Monday). Do not remove the small white steri strips over your incision. These will typically fall off on their own in 7-10 days. Do not attempt to replace these if they should fall off sooner. On post-op day #1, after you've removed the bandaids, you can shower normally. You can allow warm water and soap to run over the incision. Do not scrub the incision. After showering pat your skin and steri strips dry. Do not submerge or soak your incisions under water for 2 weeks.                  Follow-up Appointments     Follow-up and recommended labs and tests        Please follow-up in 2 weeks in Dr. Chapa office for your first postoperative appointment. Call [phone] to schedule.  Our clinic's name is [facility]. The address is [address] [place] [address], [address], [place], [address]            Follow-up and recommended labs and tests        Follow up with Dr. Chapa , at (location with clinic name or city) [place] office, within 2 weeks  to evaluate after surgery. No follow up labs or test are needed.                  Pending Results     No orders found for last 3 day(s).        "     Statement of Approval     Ordered          09/10/17 1030  I have reviewed and agree with all the recommendations and orders detailed in this document.  EFFECTIVE NOW     Approved and electronically signed by:  Gera Selby MD             Admission Information     Date & Time Provider Department Dept. Phone    [address] Marguerite Cross MD [place] [address] [facility] [phone]      Your Vitals Were     Blood Pressure Temperature Respirations Last Period Pulse Oximetry       111/67 98.7  F (37.1  C) 16 2005 100%       MyChart Information     [facility] lets you send messages to your doctor, view your test results, renew your prescriptions, schedule appointments and more. To sign up, go to [email or web address] . Click on \"Log in\" on the left side of the screen, which will take you to the Welcome page. Then click on \"Sign up Now\" on the right side of the page.     You will be asked to enter the access code listed below, as well as some personal information. Please follow the directions to create your username and password.     Your access code is: [email or web address]  Expires: 2017 10:35 AM     Your access code will  in 90 days. If you need help or a new code, please call your [place] clinic or [phone].        Care EveryWhere ID     This is your Care EveryWhere ID. This could be used by other organizations to access your [place] medical records  [id number]        Equal Access to Services     CALLIE MATAMOROS AH: Hadii man connollyo Soradha, waaxda luqadaha, qaybta kaalmada adeegyada, angelika shanks . So [facility] [phone].    ATENCIÓN: Si habla español, tiene a dahl disposición servicios gratuitos de asistencia lingüística. Llame al [phone].    We comply with applicable federal civil rights laws and [place] laws. We do not discriminate on the basis of race, color, national origin, age, disability sex, sexual orientation or gender " identity.               Review of your medicines      START taking        Dose / Directions    oxyCODONE 5 MG IR tablet   Commonly known as:  ROXICODONE   Used for:  Acute cholecystitis        Dose:  5-10 mg   Take 1-2 tablets (5-10 mg) by mouth every 4 hours as needed for moderate to severe pain   Quantity:  25 tablet   Refills:  0         CONTINUE these medicines which may have CHANGED, or have new prescriptions. If we are uncertain of the size of tablets/capsules you have at home, strength may be listed as something that might have changed.        Dose / Directions    hydrochlorothiazide 25 MG tablet   Commonly known as:  HYDRODIURIL   This may have changed:  medication strength   Used for:  Benign essential hypertension        Dose:  25 mg   Start taking on:  9/12/2017   Take 1 tablet (25 mg) by mouth daily   Quantity:  30 tablet   Refills:  0         CONTINUE these medicines which have NOT CHANGED        Dose / Directions    aspirin-acetaminophen-caffeine 250-250-65 MG per tablet   Commonly known as:  EXCEDRIN MIGRAINE        Dose:  1-2 tablet   Take 1-2 tablets by mouth every 6 hours as needed for headaches   Refills:  0       estradiol 0.05 MG/24HR BIW patch   Commonly known as:  VIVELLE-DOT        Dose:  1 patch   Place 1 patch onto the skin twice a week Sun/Thurs   Refills:  0       FLUOXETINE HCL PO        Dose:  60 mg   Take 60 mg by mouth daily Takes every other month, alternating with Sertraline   Refills:  0       LORAZEPAM PO        Dose:  0.5 mg   Take 0.5 mg by mouth daily as needed for anxiety   Refills:  0       progesterone 200 MG capsule   Commonly known as:  PROMETRIUM        Dose:  200 mg   Take 200 mg by mouth At Bedtime   Refills:  0       SERTRALINE HCL PO        Dose:  200 mg   Take 200 mg by mouth daily Alternates Sertraline with Fluoxetine every other month   Refills:  0       WELLBUTRIN SR PO        Dose:  200 mg   Take 200 mg by mouth daily   Refills:  0       ZOLPIDEM TARTRATE PO         Dose:  10 mg   Take 10 mg by mouth nightly as needed for sleep   Refills:  0            Where to get your medicines      Some of these will need a paper prescription and others can be bought over the counter. Ask your nurse if you have questions.     Bring a paper prescription for each of these medications     oxyCODONE 5 MG IR tablet       You don't need a prescription for these medications     hydrochlorothiazide 25 MG tablet                Protect others around you: Learn how to safely use, store and throw away your medicines at www.disposemymeds.org.             Medication List: This is a list of all your medications and when to take them. Check marks below indicate your daily home schedule. Keep this list as a reference.      Medications           Morning Afternoon Evening Bedtime As Needed    aspirin-acetaminophen-caffeine 250-250-65 MG per tablet   Commonly known as:  EXCEDRIN MIGRAINE   Take 1-2 tablets by mouth every 6 hours as needed for headaches                                estradiol 0.05 MG/24HR BIW patch   Commonly known as:  VIVELLE-DOT   Place 1 patch onto the skin twice a week Sun/Thurs                                FLUOXETINE HCL PO   Take 60 mg by mouth daily Takes every other month, alternating with Sertraline                                hydrochlorothiazide 25 MG tablet   Commonly known as:  HYDRODIURIL   Take 1 tablet (25 mg) by mouth daily   Start taking on:  9/12/2017                                LORAZEPAM PO   Take 0.5 mg by mouth daily as needed for anxiety                                oxyCODONE 5 MG IR tablet   Commonly known as:  ROXICODONE   Take 1-2 tablets (5-10 mg) by mouth every 4 hours as needed for moderate to severe pain   Last time this was given:  10 mg on 9/10/2017 12:10 PM                                progesterone 200 MG capsule   Commonly known as:  PROMETRIUM   Take 200 mg by mouth At Bedtime                                SERTRALINE HCL PO   Take 200 mg by  mouth daily Alternates Sertraline with Fluoxetine every other month   Last time this was given:  200 mg on 9/10/2017  8:42 AM                                WELLBUTRIN SR PO   Take 200 mg by mouth daily   Last time this was given:  200 mg on 9/10/2017  8:42 AM                                ZOLPIDEM TARTRATE PO   Take 10 mg by mouth nightly as needed for sleep

## 2017-09-10 ENCOUNTER — HOSPITAL ENCOUNTER (OUTPATIENT)
Facility: CLINIC | Age: 47
Setting detail: OBSERVATION
Discharge: HOME | End: 2017-09-12
Attending: EMERGENCY MEDICINE | Admitting: SURGERY
Payer: COMMERCIAL

## 2017-09-10 ENCOUNTER — APPOINTMENT (OUTPATIENT)
Dept: CT IMAGING | Facility: CLINIC | Age: 47
End: 2017-09-10
Attending: EMERGENCY MEDICINE
Payer: COMMERCIAL

## 2017-09-10 ENCOUNTER — ANESTHESIA (OUTPATIENT)
Dept: SURGERY | Facility: CLINIC | Age: 47
DRG: 418 | End: 2017-09-10
Payer: COMMERCIAL

## 2017-09-10 VITALS
OXYGEN SATURATION: 100 % | SYSTOLIC BLOOD PRESSURE: 111 MMHG | RESPIRATION RATE: 16 BRPM | TEMPERATURE: 98.7 F | DIASTOLIC BLOOD PRESSURE: 67 MMHG

## 2017-09-10 DIAGNOSIS — G89.18 POST-OP PAIN: ICD-10-CM

## 2017-09-10 DIAGNOSIS — R79.89 ELEVATED LFTS: ICD-10-CM

## 2017-09-10 DIAGNOSIS — G89.18 ACUTE POST-OPERATIVE PAIN: ICD-10-CM

## 2017-09-10 PROBLEM — K81.0 CHOLECYSTITIS, ACUTE: Status: ACTIVE | Noted: 2017-09-10

## 2017-09-10 LAB
ALBUMIN SERPL-MCNC: 4.1 G/DL (ref 3.4–5)
ALP SERPL-CCNC: 307 U/L (ref 40–150)
ALT SERPL W P-5'-P-CCNC: 332 U/L (ref 0–50)
ANION GAP SERPL CALCULATED.3IONS-SCNC: 7 MMOL/L (ref 3–14)
ANION GAP SERPL CALCULATED.3IONS-SCNC: 9 MMOL/L (ref 3–14)
AST SERPL W P-5'-P-CCNC: 387 U/L (ref 0–45)
BASOPHILS # BLD AUTO: 0 10E9/L (ref 0–0.2)
BASOPHILS # BLD AUTO: 0 10E9/L (ref 0–0.2)
BASOPHILS NFR BLD AUTO: 0.2 %
BASOPHILS NFR BLD AUTO: 0.4 %
BILIRUB SERPL-MCNC: 1.4 MG/DL (ref 0.2–1.3)
BUN SERPL-MCNC: 6 MG/DL (ref 7–30)
BUN SERPL-MCNC: 6 MG/DL (ref 7–30)
CALCIUM SERPL-MCNC: 8.4 MG/DL (ref 8.5–10.1)
CALCIUM SERPL-MCNC: 8.6 MG/DL (ref 8.5–10.1)
CHLORIDE SERPL-SCNC: 104 MMOL/L (ref 94–109)
CHLORIDE SERPL-SCNC: 108 MMOL/L (ref 94–109)
CO2 SERPL-SCNC: 25 MMOL/L (ref 20–32)
CO2 SERPL-SCNC: 27 MMOL/L (ref 20–32)
CREAT SERPL-MCNC: 0.79 MG/DL (ref 0.52–1.04)
CREAT SERPL-MCNC: 0.85 MG/DL (ref 0.52–1.04)
DIFFERENTIAL METHOD BLD: ABNORMAL
DIFFERENTIAL METHOD BLD: ABNORMAL
EOSINOPHIL # BLD AUTO: 0 10E9/L (ref 0–0.7)
EOSINOPHIL # BLD AUTO: 0.1 10E9/L (ref 0–0.7)
EOSINOPHIL NFR BLD AUTO: 0.2 %
EOSINOPHIL NFR BLD AUTO: 0.7 %
ERYTHROCYTE [DISTWIDTH] IN BLOOD BY AUTOMATED COUNT: 13.6 % (ref 10–15)
ERYTHROCYTE [DISTWIDTH] IN BLOOD BY AUTOMATED COUNT: 13.7 % (ref 10–15)
GFR SERPL CREATININE-BSD FRML MDRD: 71 ML/MIN/1.7M2
GFR SERPL CREATININE-BSD FRML MDRD: 78 ML/MIN/1.7M2
GLUCOSE SERPL-MCNC: 125 MG/DL (ref 70–99)
GLUCOSE SERPL-MCNC: 138 MG/DL (ref 70–99)
HCT VFR BLD AUTO: 38.8 % (ref 35–47)
HCT VFR BLD AUTO: 42.5 % (ref 35–47)
HGB BLD-MCNC: 13.1 G/DL (ref 11.7–15.7)
HGB BLD-MCNC: 14.3 G/DL (ref 11.7–15.7)
IMM GRANULOCYTES # BLD: 0 10E9/L (ref 0–0.4)
IMM GRANULOCYTES # BLD: 0 10E9/L (ref 0–0.4)
IMM GRANULOCYTES NFR BLD: 0.2 %
IMM GRANULOCYTES NFR BLD: 0.3 %
INTERPRETATION ECG - MUSE: NORMAL
LYMPHOCYTES # BLD AUTO: 0.7 10E9/L (ref 0.8–5.3)
LYMPHOCYTES # BLD AUTO: 1.1 10E9/L (ref 0.8–5.3)
LYMPHOCYTES NFR BLD AUTO: 10.9 %
LYMPHOCYTES NFR BLD AUTO: 8.8 %
MCH RBC QN AUTO: 29.9 PG (ref 26.5–33)
MCH RBC QN AUTO: 30 PG (ref 26.5–33)
MCHC RBC AUTO-ENTMCNC: 33.6 G/DL (ref 31.5–36.5)
MCHC RBC AUTO-ENTMCNC: 33.8 G/DL (ref 31.5–36.5)
MCV RBC AUTO: 89 FL (ref 78–100)
MCV RBC AUTO: 89 FL (ref 78–100)
MONOCYTES # BLD AUTO: 0.2 10E9/L (ref 0–1.3)
MONOCYTES # BLD AUTO: 0.6 10E9/L (ref 0–1.3)
MONOCYTES NFR BLD AUTO: 2.9 %
MONOCYTES NFR BLD AUTO: 5.7 %
NEUTROPHILS # BLD AUTO: 7.2 10E9/L (ref 1.6–8.3)
NEUTROPHILS # BLD AUTO: 8.5 10E9/L (ref 1.6–8.3)
NEUTROPHILS NFR BLD AUTO: 82.2 %
NEUTROPHILS NFR BLD AUTO: 87.5 %
NRBC # BLD AUTO: 0 10*3/UL
NRBC # BLD AUTO: 0 10*3/UL
NRBC BLD AUTO-RTO: 0 /100
NRBC BLD AUTO-RTO: 0 /100
PLATELET # BLD AUTO: 128 10E9/L (ref 150–450)
PLATELET # BLD AUTO: 153 10E9/L (ref 150–450)
POTASSIUM SERPL-SCNC: 3.2 MMOL/L (ref 3.4–5.3)
POTASSIUM SERPL-SCNC: 3.5 MMOL/L (ref 3.4–5.3)
PROT SERPL-MCNC: 7.2 G/DL (ref 6.8–8.8)
RBC # BLD AUTO: 4.38 10E12/L (ref 3.8–5.2)
RBC # BLD AUTO: 4.76 10E12/L (ref 3.8–5.2)
SODIUM SERPL-SCNC: 140 MMOL/L (ref 133–144)
SODIUM SERPL-SCNC: 140 MMOL/L (ref 133–144)
WBC # BLD AUTO: 10.3 10E9/L (ref 4–11)
WBC # BLD AUTO: 8.3 10E9/L (ref 4–11)

## 2017-09-10 PROCEDURE — 99285 EMERGENCY DEPT VISIT HI MDM: CPT | Mod: 25

## 2017-09-10 PROCEDURE — 99232 SBSQ HOSP IP/OBS MODERATE 35: CPT | Performed by: HOSPITALIST

## 2017-09-10 PROCEDURE — 0F944ZZ DRAINAGE OF GALLBLADDER, PERCUTANEOUS ENDOSCOPIC APPROACH: ICD-10-PCS | Performed by: SURGERY

## 2017-09-10 PROCEDURE — 71260 CT THORAX DX C+: CPT

## 2017-09-10 PROCEDURE — 25000128 H RX IP 250 OP 636: Performed by: NURSE ANESTHETIST, CERTIFIED REGISTERED

## 2017-09-10 PROCEDURE — 25000132 ZZH RX MED GY IP 250 OP 250 PS 637: Performed by: PHYSICIAN ASSISTANT

## 2017-09-10 PROCEDURE — 25000125 ZZHC RX 250: Performed by: NURSE ANESTHETIST, CERTIFIED REGISTERED

## 2017-09-10 PROCEDURE — 47562 LAPAROSCOPIC CHOLECYSTECTOMY: CPT | Performed by: SURGERY

## 2017-09-10 PROCEDURE — 93005 ELECTROCARDIOGRAM TRACING: CPT

## 2017-09-10 PROCEDURE — 25000128 H RX IP 250 OP 636: Performed by: EMERGENCY MEDICINE

## 2017-09-10 PROCEDURE — 36000058 ZZH SURGERY LEVEL 3 EA 15 ADDTL MIN: Performed by: SURGERY

## 2017-09-10 PROCEDURE — 85027 COMPLETE CBC AUTOMATED: CPT | Performed by: PHYSICIAN ASSISTANT

## 2017-09-10 PROCEDURE — 88304 TISSUE EXAM BY PATHOLOGIST: CPT | Performed by: SURGERY

## 2017-09-10 PROCEDURE — 25000128 H RX IP 250 OP 636: Performed by: ANESTHESIOLOGY

## 2017-09-10 PROCEDURE — 25000125 ZZHC RX 250: Performed by: EMERGENCY MEDICINE

## 2017-09-10 PROCEDURE — 40000169 ZZH STATISTIC PRE-PROCEDURE ASSESSMENT I: Performed by: SURGERY

## 2017-09-10 PROCEDURE — 25000125 ZZHC RX 250: Performed by: PHYSICIAN ASSISTANT

## 2017-09-10 PROCEDURE — 88304 TISSUE EXAM BY PATHOLOGIST: CPT | Mod: 26 | Performed by: SURGERY

## 2017-09-10 PROCEDURE — 96374 THER/PROPH/DIAG INJ IV PUSH: CPT

## 2017-09-10 PROCEDURE — 85025 COMPLETE CBC W/AUTO DIFF WBC: CPT | Performed by: PHYSICIAN ASSISTANT

## 2017-09-10 PROCEDURE — 36000056 ZZH SURGERY LEVEL 3 1ST 30 MIN: Performed by: SURGERY

## 2017-09-10 PROCEDURE — 80053 COMPREHEN METABOLIC PANEL: CPT | Performed by: EMERGENCY MEDICINE

## 2017-09-10 PROCEDURE — 74177 CT ABD & PELVIS W/CONTRAST: CPT

## 2017-09-10 PROCEDURE — S0028 INJECTION, FAMOTIDINE, 20 MG: HCPCS | Performed by: PHYSICIAN ASSISTANT

## 2017-09-10 PROCEDURE — 25000566 ZZH SEVOFLURANE, EA 15 MIN: Performed by: SURGERY

## 2017-09-10 PROCEDURE — 36415 COLL VENOUS BLD VENIPUNCTURE: CPT | Performed by: PHYSICIAN ASSISTANT

## 2017-09-10 PROCEDURE — 85025 COMPLETE CBC W/AUTO DIFF WBC: CPT | Performed by: EMERGENCY MEDICINE

## 2017-09-10 PROCEDURE — 99207 ZZC APP CREDIT; MD BILLING SHARED VISIT: CPT | Performed by: PHYSICIAN ASSISTANT

## 2017-09-10 PROCEDURE — 80048 BASIC METABOLIC PNL TOTAL CA: CPT | Performed by: PHYSICIAN ASSISTANT

## 2017-09-10 PROCEDURE — 27210794 ZZH OR GENERAL SUPPLY STERILE: Performed by: SURGERY

## 2017-09-10 PROCEDURE — 96375 TX/PRO/DX INJ NEW DRUG ADDON: CPT

## 2017-09-10 PROCEDURE — 71000012 ZZH RECOVERY PHASE 1 LEVEL 1 FIRST HR: Performed by: SURGERY

## 2017-09-10 PROCEDURE — 37000008 ZZH ANESTHESIA TECHNICAL FEE, 1ST 30 MIN: Performed by: SURGERY

## 2017-09-10 PROCEDURE — 25000128 H RX IP 250 OP 636: Performed by: PHYSICIAN ASSISTANT

## 2017-09-10 PROCEDURE — 25000132 ZZH RX MED GY IP 250 OP 250 PS 637: Performed by: INTERNAL MEDICINE

## 2017-09-10 PROCEDURE — 25000125 ZZHC RX 250: Performed by: SURGERY

## 2017-09-10 PROCEDURE — 37000009 ZZH ANESTHESIA TECHNICAL FEE, EACH ADDTL 15 MIN: Performed by: SURGERY

## 2017-09-10 PROCEDURE — 0FT44ZZ RESECTION OF GALLBLADDER, PERCUTANEOUS ENDOSCOPIC APPROACH: ICD-10-PCS | Performed by: SURGERY

## 2017-09-10 RX ORDER — PROCHLORPERAZINE MALEATE 5 MG
5-10 TABLET ORAL EVERY 6 HOURS PRN
Status: DISCONTINUED | OUTPATIENT
Start: 2017-09-10 | End: 2017-09-12 | Stop reason: HOSPADM

## 2017-09-10 RX ORDER — LIDOCAINE HYDROCHLORIDE 20 MG/ML
INJECTION, SOLUTION INFILTRATION; PERINEURAL PRN
Status: DISCONTINUED | OUTPATIENT
Start: 2017-09-10 | End: 2017-09-10

## 2017-09-10 RX ORDER — PROCHLORPERAZINE 25 MG
25 SUPPOSITORY, RECTAL RECTAL EVERY 12 HOURS PRN
Status: DISCONTINUED | OUTPATIENT
Start: 2017-09-10 | End: 2017-09-12 | Stop reason: HOSPADM

## 2017-09-10 RX ORDER — ONDANSETRON 2 MG/ML
4 INJECTION INTRAMUSCULAR; INTRAVENOUS ONCE
Status: COMPLETED | OUTPATIENT
Start: 2017-09-10 | End: 2017-09-10

## 2017-09-10 RX ORDER — HYDROCHLOROTHIAZIDE 25 MG/1
25 TABLET ORAL DAILY
Qty: 30 TABLET
Start: 2017-09-12

## 2017-09-10 RX ORDER — EPHEDRINE SULFATE 50 MG/ML
INJECTION, SOLUTION INTRAMUSCULAR; INTRAVENOUS; SUBCUTANEOUS PRN
Status: DISCONTINUED | OUTPATIENT
Start: 2017-09-10 | End: 2017-09-10

## 2017-09-10 RX ORDER — FENTANYL CITRATE 50 UG/ML
25-50 INJECTION, SOLUTION INTRAMUSCULAR; INTRAVENOUS
Status: DISCONTINUED | OUTPATIENT
Start: 2017-09-10 | End: 2017-09-10 | Stop reason: HOSPADM

## 2017-09-10 RX ORDER — OXYCODONE HYDROCHLORIDE 5 MG/1
5-10 TABLET ORAL
Status: DISCONTINUED | OUTPATIENT
Start: 2017-09-10 | End: 2017-09-12 | Stop reason: HOSPADM

## 2017-09-10 RX ORDER — HYDROMORPHONE HYDROCHLORIDE 1 MG/ML
.3-.5 INJECTION, SOLUTION INTRAMUSCULAR; INTRAVENOUS; SUBCUTANEOUS EVERY 5 MIN PRN
Status: DISCONTINUED | OUTPATIENT
Start: 2017-09-10 | End: 2017-09-10 | Stop reason: HOSPADM

## 2017-09-10 RX ORDER — HYDROMORPHONE HYDROCHLORIDE 1 MG/ML
.3-.5 INJECTION, SOLUTION INTRAMUSCULAR; INTRAVENOUS; SUBCUTANEOUS
Status: DISCONTINUED | OUTPATIENT
Start: 2017-09-10 | End: 2017-09-12 | Stop reason: HOSPADM

## 2017-09-10 RX ORDER — ONDANSETRON 4 MG/1
4 TABLET, ORALLY DISINTEGRATING ORAL EVERY 6 HOURS PRN
Status: DISCONTINUED | OUTPATIENT
Start: 2017-09-10 | End: 2017-09-12 | Stop reason: HOSPADM

## 2017-09-10 RX ORDER — SODIUM CHLORIDE, SODIUM LACTATE, POTASSIUM CHLORIDE, CALCIUM CHLORIDE 600; 310; 30; 20 MG/100ML; MG/100ML; MG/100ML; MG/100ML
INJECTION, SOLUTION INTRAVENOUS CONTINUOUS
Status: DISCONTINUED | OUTPATIENT
Start: 2017-09-10 | End: 2017-09-10 | Stop reason: HOSPADM

## 2017-09-10 RX ORDER — FENTANYL CITRATE 50 UG/ML
INJECTION, SOLUTION INTRAMUSCULAR; INTRAVENOUS PRN
Status: DISCONTINUED | OUTPATIENT
Start: 2017-09-10 | End: 2017-09-10

## 2017-09-10 RX ORDER — KETOROLAC TROMETHAMINE 30 MG/ML
INJECTION, SOLUTION INTRAMUSCULAR; INTRAVENOUS PRN
Status: DISCONTINUED | OUTPATIENT
Start: 2017-09-10 | End: 2017-09-10

## 2017-09-10 RX ORDER — SODIUM CHLORIDE AND POTASSIUM CHLORIDE 150; 900 MG/100ML; MG/100ML
INJECTION, SOLUTION INTRAVENOUS CONTINUOUS
Status: DISCONTINUED | OUTPATIENT
Start: 2017-09-10 | End: 2017-09-12 | Stop reason: CLARIF

## 2017-09-10 RX ORDER — ONDANSETRON 2 MG/ML
4 INJECTION INTRAMUSCULAR; INTRAVENOUS EVERY 30 MIN PRN
Status: DISCONTINUED | OUTPATIENT
Start: 2017-09-10 | End: 2017-09-10 | Stop reason: HOSPADM

## 2017-09-10 RX ORDER — OXYCODONE HYDROCHLORIDE 5 MG/1
5-10 TABLET ORAL EVERY 4 HOURS PRN
Status: DISCONTINUED | OUTPATIENT
Start: 2017-09-10 | End: 2017-09-10 | Stop reason: HOSPADM

## 2017-09-10 RX ORDER — PROPOFOL 10 MG/ML
INJECTION, EMULSION INTRAVENOUS PRN
Status: DISCONTINUED | OUTPATIENT
Start: 2017-09-10 | End: 2017-09-10

## 2017-09-10 RX ORDER — OXYCODONE HYDROCHLORIDE 5 MG/1
5-10 TABLET ORAL EVERY 4 HOURS PRN
Qty: 25 TABLET | Refills: 0 | Status: SHIPPED | OUTPATIENT
Start: 2017-09-10

## 2017-09-10 RX ORDER — ONDANSETRON 4 MG/1
4 TABLET, ORALLY DISINTEGRATING ORAL EVERY 30 MIN PRN
Status: DISCONTINUED | OUTPATIENT
Start: 2017-09-10 | End: 2017-09-10 | Stop reason: HOSPADM

## 2017-09-10 RX ORDER — IOPAMIDOL 755 MG/ML
86 INJECTION, SOLUTION INTRAVASCULAR ONCE
Status: COMPLETED | OUTPATIENT
Start: 2017-09-10 | End: 2017-09-10

## 2017-09-10 RX ORDER — ONDANSETRON 2 MG/ML
4 INJECTION INTRAMUSCULAR; INTRAVENOUS EVERY 6 HOURS PRN
Status: DISCONTINUED | OUTPATIENT
Start: 2017-09-10 | End: 2017-09-12 | Stop reason: HOSPADM

## 2017-09-10 RX ORDER — ONDANSETRON 2 MG/ML
INJECTION INTRAMUSCULAR; INTRAVENOUS PRN
Status: DISCONTINUED | OUTPATIENT
Start: 2017-09-10 | End: 2017-09-10

## 2017-09-10 RX ORDER — DEXAMETHASONE SODIUM PHOSPHATE 4 MG/ML
INJECTION, SOLUTION INTRA-ARTICULAR; INTRALESIONAL; INTRAMUSCULAR; INTRAVENOUS; SOFT TISSUE PRN
Status: DISCONTINUED | OUTPATIENT
Start: 2017-09-10 | End: 2017-09-10

## 2017-09-10 RX ORDER — MEPERIDINE HYDROCHLORIDE 25 MG/ML
12.5 INJECTION INTRAMUSCULAR; INTRAVENOUS; SUBCUTANEOUS EVERY 5 MIN PRN
Status: DISCONTINUED | OUTPATIENT
Start: 2017-09-10 | End: 2017-09-10 | Stop reason: HOSPADM

## 2017-09-10 RX ORDER — NALOXONE HYDROCHLORIDE 0.4 MG/ML
.1-.4 INJECTION, SOLUTION INTRAMUSCULAR; INTRAVENOUS; SUBCUTANEOUS
Status: DISCONTINUED | OUTPATIENT
Start: 2017-09-10 | End: 2017-09-12 | Stop reason: HOSPADM

## 2017-09-10 RX ORDER — BUPIVACAINE HYDROCHLORIDE AND EPINEPHRINE 2.5; 5 MG/ML; UG/ML
INJECTION, SOLUTION INFILTRATION; PERINEURAL PRN
Status: DISCONTINUED | OUTPATIENT
Start: 2017-09-10 | End: 2017-09-10 | Stop reason: HOSPADM

## 2017-09-10 RX ORDER — MORPHINE SULFATE 4 MG/ML
4 INJECTION, SOLUTION INTRAMUSCULAR; INTRAVENOUS ONCE
Status: COMPLETED | OUTPATIENT
Start: 2017-09-10 | End: 2017-09-10

## 2017-09-10 RX ADMIN — SODIUM CHLORIDE 80 ML: 9 INJECTION, SOLUTION INTRAVENOUS at 23:05

## 2017-09-10 RX ADMIN — HYDROMORPHONE HYDROCHLORIDE 0.3 MG: 1 INJECTION, SOLUTION INTRAMUSCULAR; INTRAVENOUS; SUBCUTANEOUS at 04:10

## 2017-09-10 RX ADMIN — PROPOFOL 160 MG: 10 INJECTION, EMULSION INTRAVENOUS at 01:45

## 2017-09-10 RX ADMIN — DEXAMETHASONE SODIUM PHOSPHATE 4 MG: 4 INJECTION, SOLUTION INTRA-ARTICULAR; INTRALESIONAL; INTRAMUSCULAR; INTRAVENOUS; SOFT TISSUE at 01:57

## 2017-09-10 RX ADMIN — SODIUM CHLORIDE, POTASSIUM CHLORIDE, SODIUM LACTATE AND CALCIUM CHLORIDE: 600; 310; 30; 20 INJECTION, SOLUTION INTRAVENOUS at 02:26

## 2017-09-10 RX ADMIN — SERTRALINE HYDROCHLORIDE 200 MG: 100 TABLET ORAL at 08:42

## 2017-09-10 RX ADMIN — SODIUM CHLORIDE, POTASSIUM CHLORIDE, SODIUM LACTATE AND CALCIUM CHLORIDE: 600; 310; 30; 20 INJECTION, SOLUTION INTRAVENOUS at 00:41

## 2017-09-10 RX ADMIN — FAMOTIDINE 20 MG: 10 INJECTION, SOLUTION INTRAVENOUS at 09:22

## 2017-09-10 RX ADMIN — ACETAMINOPHEN 650 MG: 325 TABLET, FILM COATED ORAL at 09:28

## 2017-09-10 RX ADMIN — MIDAZOLAM HYDROCHLORIDE 1 MG: 1 INJECTION, SOLUTION INTRAMUSCULAR; INTRAVENOUS at 01:40

## 2017-09-10 RX ADMIN — MORPHINE SULFATE 4 MG: 4 INJECTION, SOLUTION INTRAMUSCULAR; INTRAVENOUS at 21:54

## 2017-09-10 RX ADMIN — SODIUM CHLORIDE, POTASSIUM CHLORIDE, SODIUM LACTATE AND CALCIUM CHLORIDE: 600; 310; 30; 20 INJECTION, SOLUTION INTRAVENOUS at 01:10

## 2017-09-10 RX ADMIN — MIDAZOLAM HYDROCHLORIDE 1 MG: 1 INJECTION, SOLUTION INTRAMUSCULAR; INTRAVENOUS at 01:43

## 2017-09-10 RX ADMIN — ROCURONIUM BROMIDE 20 MG: 10 INJECTION INTRAVENOUS at 02:00

## 2017-09-10 RX ADMIN — Medication 5 MG: at 02:10

## 2017-09-10 RX ADMIN — BUPROPION HYDROCHLORIDE 200 MG: 200 TABLET, EXTENDED RELEASE ORAL at 08:42

## 2017-09-10 RX ADMIN — ONDANSETRON 4 MG: 2 SOLUTION INTRAMUSCULAR; INTRAVENOUS at 21:54

## 2017-09-10 RX ADMIN — SODIUM CHLORIDE: 9 INJECTION, SOLUTION INTRAVENOUS at 04:23

## 2017-09-10 RX ADMIN — FENTANYL CITRATE 100 MCG: 50 INJECTION, SOLUTION INTRAMUSCULAR; INTRAVENOUS at 01:47

## 2017-09-10 RX ADMIN — KETOROLAC TROMETHAMINE 30 MG: 30 INJECTION, SOLUTION INTRAMUSCULAR at 02:28

## 2017-09-10 RX ADMIN — OXYCODONE HYDROCHLORIDE 10 MG: 5 TABLET ORAL at 12:10

## 2017-09-10 RX ADMIN — LIDOCAINE HYDROCHLORIDE 40 MG: 20 INJECTION, SOLUTION INFILTRATION; PERINEURAL at 01:47

## 2017-09-10 RX ADMIN — OXYCODONE HYDROCHLORIDE 5 MG: 5 TABLET ORAL at 08:42

## 2017-09-10 RX ADMIN — IOPAMIDOL 86 ML: 755 INJECTION, SOLUTION INTRAVENOUS at 23:04

## 2017-09-10 RX ADMIN — ONDANSETRON 4 MG: 2 INJECTION INTRAMUSCULAR; INTRAVENOUS at 02:10

## 2017-09-10 RX ADMIN — SUCCINYLCHOLINE CHLORIDE 100 MG: 20 INJECTION, SOLUTION INTRAMUSCULAR; INTRAVENOUS at 01:48

## 2017-09-10 ASSESSMENT — ENCOUNTER SYMPTOMS
VOMITING: 0
FEVER: 0
ARTHRALGIAS: 1
ABDOMINAL PAIN: 1
NAUSEA: 0

## 2017-09-10 NOTE — ANESTHESIA POSTPROCEDURE EVALUATION
Patient: Gabbi Grady    Procedure(s):  LAPAROSCOPIC CHLOECYSTECTOMY - Wound Class: II-Clean Contaminated    Diagnosis:unknown  Diagnosis Additional Information: No value filed.    Anesthesia Type:  General, RSI, ETT    Note:  Anesthesia Post Evaluation    Patient location during evaluation: PACU  Patient participation: Able to fully participate in evaluation  Level of consciousness: awake  Pain management: adequate  Airway patency: patent  Cardiovascular status: acceptable  Respiratory status: acceptable  Hydration status: acceptable  PONV: none     Anesthetic complications: None          Last vitals:  Vitals:    09/10/17 0250 09/10/17 0300 09/10/17 0310   BP: 118/69 123/67 120/69   Resp: 22 21 14   Temp: 37.2  C (99  F) 37.3  C (99.1  F) 37.2  C (99  F)   SpO2: 98% 99% 100%         Electronically Signed By: Xiao Easton MD  September 10, 2017  3:12 AM

## 2017-09-10 NOTE — ANESTHESIA PREPROCEDURE EVALUATION
Anesthesia Evaluation     . Pt has had prior anesthetic.     History of anesthetic complications   - PONV        ROS/MED HX    ENT/Pulmonary:      (-) sleep apnea   Neurologic:     (+)migraines,     Cardiovascular:     (+) hypertension----. : . . . :. .       METS/Exercise Tolerance:  >4 METS   Hematologic:         Musculoskeletal:         GI/Hepatic:     (+) cholecystitis/cholelithiasis,      (-) GERD   Renal/Genitourinary:         Endo:      (-) Type I DM   Psychiatric:     (+) psychiatric history depression      Infectious Disease:         Malignancy:         Other:                     Physical Exam  Normal systems: dental    Airway   Mallampati: II  TM distance: >3 FB  Neck ROM: full    Dental     Cardiovascular   Rhythm and rate: regular and normal      Pulmonary    breath sounds clear to auscultation                    Anesthesia Plan      History & Physical Review  History and physical reviewed and following examination; no interval change.    ASA Status:  2 .    NPO Status:  > 6 hours    Plan for General, RSI and ETT with Intravenous induction. Maintenance will be Balanced.    PONV prophylaxis:  Ondansetron (or other 5HT-3) and Dexamethasone or Solumedrol  OGT  toradol  glidescope available        Postoperative Care      Consents  Anesthetic plan, risks, benefits and alternatives discussed with:  Patient..                          .

## 2017-09-10 NOTE — IP AVS SNAPSHOT
[place] [facility] [facility]    [address] [place] [address]    Phone:  [phone]                                       After Visit Summary   9/10/2017    Gabbi Grady    MRN: [id number]           After Visit Summary Signature Page     I have received my discharge instructions, and my questions have been answered. I have discussed any challenges I see with this plan with the nurse or doctor.    ..........................................................................................................................................  Patient/Patient Representative Signature      ..........................................................................................................................................  Patient Representative Print Name and Relationship to Patient    ..................................................               ................................................  Date                                            Time    ..........................................................................................................................................  Reviewed by Signature/Title    ...................................................              ..............................................  Date                                                            Time

## 2017-09-10 NOTE — PROGRESS NOTES
[facility]  Hospitalist Progress Note  Admission Date:  9/9/2017       Assessment and Plan:   Gabbi Grady is a 46-year-old female with a past medical history significant for major depressive disorder, essential hypertension, headaches/migraines and menopausal state who is being admitted to [facility] on 09/09/2017 due to acute cholecystitis.       #Acute cholecystitis with abdominal pain, N/V s/p laparoscopic cholecystectomy early morning of 9/10/17   #Mild Leukocytosis, WBC=11.3  -dx confirmed on right upper quadrant limited ultrasound and abdomen and pelvis CT.    PLAN:  -General Surgery consulted and patient was brought to the OR late on 9/9, underwent lap grace, findings included an intensely inflamed and edematous gallbladder, tense and full of clear mucoid fluid consistent with hydrops gallbladder, EBL was 10 cc, no complications   -on IV abx of Invanz --defer to surgery if she needs further abx/transition to oral or can we d/c?  -cont pain control with oral percocet   -advance diet as tolerated  -surgery team to see today and further outline a plan     # Essential hypertension:  The patient's blood pressures appear to be stable at this point.    PLAN:  -cont prn intravenous hydralazine  -can restart PTA HCTZ in 1-2 days     #Major depressive disorder:   PLAN:  -cont PTA Wellbutrin 200 mg daily, fluoxetine 50 mg daily, sertraline 200 mg daily and as needed Ativan 0.5 mg daily     #Headache/migraines:    PLAN:  -cont pain control as needed     # Insomnia:   PLAN:  -cont prn Ambien.     #Menopausal state:  PLAN:  -cont chronic hormonal tx with progesterone 200 mg capsule and restart Estradiol patch at home upon discharge     #FEN-  -Currently labs are unremarkable, will obtain CBC and CMP again in am  -cont IVF of NS @ 100 cc, diet as tolerated; can d/c fluids once she is tolerating PO well    -ok for oral meds     #Deep venous thrombosis prophylaxis:  Will place the  patient on pneumatic compression devices.     #Dispo-pending, and per surgery     #Code-FULL    Opal Piedra PA-C ([facility])   Hospitalist  Pager: [phone]  8:13 AM  September 10, 2017                  Interval History:   Feeling 'OK'  Still having breakthrough pain, needs another dose of oxy  No N/V  Appetite is fair  Tolerated liquids well               Medications:       buPROPion (WELLBUTRIN SR) 12 hr tablet 200 mg  200 mg Oral Daily     progesterone  200 mg Oral At Bedtime     sertraline (ZOLOFT) tablet 200 mg  200 mg Oral Daily     ertapenem (INVanz) IV  1 g Intravenous Q24H     sodium chloride (PF)  3 mL Intracatheter Q8H     famotidine  20 mg Intravenous Q12H     LORazepam (ATIVAN) tablet 0.5 mg, aspirin-acetaminophen-caffeine, naloxone, lidocaine (buffered or not buffered), lidocaine 4%, sodium chloride (PF), potassium chloride, potassium chloride, potassium chloride, potassium chloride with lidocaine, potassium chloride, acetaminophen, HYDROcodone-acetaminophen, HYDROmorphone, senna-docusate, polyethylene glycol, bisacodyl, ondansetron **OR** ondansetron, prochlorperazine **OR** prochlorperazine **OR** prochlorperazine, melatonin, hydrALAZINE, oxyCODONE, ondansetron **OR** ondansetron               Physical Exam:   Patient Vitals for the past 24 hrs:   BP Temp Temp src Heart Rate Resp SpO2   09/10/17 0450 - - - - 16 -   09/10/17 0435 110/63 97.7  F (36.5  C) Oral 86 16 97 %   09/10/17 0413 119/72 99.1  F (37.3  C) Oral 81 16 96 %   09/10/17 0410 - - - - 16 -   09/10/17 0401 111/68 98.9  F (37.2  C) - 79 16 -   09/10/17 0330 119/69 99.1  F (37.3  C) - 78 21 98 %   09/10/17 0320 119/74 99  F (37.2  C) - 78 14 99 %   09/10/17 0310 120/69 99  F (37.2  C) - 81 14 100 %   09/10/17 0300 123/67 99.1  F (37.3  C) - 80 21 99 %   09/10/17 0250 118/69 99  F (37.2  C) - 77 22 98 %   09/10/17 0241 118/77 99.7  F (37.6  C) Temporal - 14 97 %   09/10/17 0053 129/82 - - - 16 -   09/10/17 0042 - 98.5  F (36.9  C)  Temporal - - -   09/10/17 0000 - - - - 16 -   17 2330 119/74 98.4  F (36.9  C) Oral 79 16 -   175 - - - -  -   17 - - - - 16 -   17 133/77 98.3  F (36.8  C) Axillary 76 16 -   17 1445 125/77 - - 87 18 98 %   17 1231 (!) 146/92 - - 88 16 98 %   17 1230 (!) 146/92 - - - - 98 %   17 1215 - - - - - 97 %   17 1200 (!) 155/96 - - - - 97 %   17 1155 - - - - - 97 %   17 1130 (!) 155/94 - - - - 93 %   17 1126 - - - - 18 -   17 1029 (!) 153/108 98.7  F (37.1  C) Oral 90 18 100 %     Wt Readings from Last 4 Encounters:   05 64.9 kg (143 lb)   05 64.4 kg (142 lb)   04 68 kg (150 lb)   04 69.9 kg (154 lb)         Vital Sign Ranges  Temperature Temp  Av.8  F (37.1  C)  Min: 97.7  F (36.5  C)  Max: 99.7  F (37.6  C)   Blood pressure Systolic (24hrs), Av , Min:110 , Max:155        Diastolic (24hrs), Av, Min:63, Max:108      Pulse No Data Recorded   Respirations Resp  Av.8  Min: 14  Max: 22   Pulse oximetry SpO2  Av.6 %  Min: 93 %  Max: 100 %         Intake/Output Summary (Last 24 hours) at 09/10/17 0813  Last data filed at 09/10/17 0600   Gross per 24 hour   Intake             1800 ml   Output              285 ml   Net             1515 ml       Constitutional:   awake, alert, cooperative, no apparent distress, and appears stated age     Neck:   Supple, symmetrical, trachea midline, no adenopathy, thyroid symmetric, not enlarged and no tenderness, skin normal     Lungs:   No increased work of breathing, good air exchange, clear to auscultation bilaterally, no crackles or wheezing     Cardiovascular:   regular rate and rhythm, normal S1 and S2, no S3 or S4, and no murmur noted     Abdomen:   + bowel sounds, soft, non-distended, non-tender, no masses palpated, no hepatosplenomegally  Multiple incisions noted with bandages, no oozing or bleeding      Extremities  No edema, cyanosis or clubbing               Data:     Recent Labs   Lab Test  09/10/17   0612  09/09/17   1045   WBC  8.3  11.3*   HGB  13.1  14.4   MCV  89  86   PLT  128*  170      Recent Labs   Lab Test  09/10/17   0612  09/09/17   1045   NA  140  140   POTASSIUM  3.5  3.8   CHLORIDE  108  106   CO2  25  23   BUN  6*  10   CR  0.79  0.80   ANIONGAP  7  11   KYAW  8.4*  9.5   GLC  138*  113*     Recent Labs   Lab Test  09/10/17   0612  09/09/17   1045   GLC  138*  113*

## 2017-09-10 NOTE — CONSULTS
SURGICAL CONSULTATION      DATE OF CONSULTATION:  2017.      CHIEF COMPLAINT:  Right upper quadrant abdominal pain.      HISTORY OF PRESENT ILLNESS:  Gabbi Grady is a 46-year-old patient in overall good health who awoke at 0100 hours this morning with severe upper abdominal pain.  This was rated as 9/10 and associated with nausea and vomiting.  She could not get into a comfortable position.  She has never had a similar episode of pain.  She presented to the Emergency Department and was seen by Dr. Juarez from emergency staff, still having significant pain.  He gave her IV Zofran and Dilaudid.  Her pain gradually improved and by 1300 hours, had almost completely resolved, though she still had some mild tenderness with palpation in the upper abdomen.      The patient denies a history of acholic stools.  As mentioned, she has never had a similar episode of pain.  She had no prior diagnosis of cholelithiasis.  The patient has been admitted.  She has been seen by the Hospitalist Service.  We were asked to see her in surgical consultation.      She did receive 1 g of ertapenem in the Emergency Department.      ALLERGIES:  Penicillin.      PAST SURGICAL HISTORY:   1.  Tonsillectomy.   2.   x3.   3.  LEEP procedure.     4.  Cerclage x2.   5.  Laparoscopy x2.   6.  Total laparoscopic hysterectomy and bilateral salpingo-oophorectomy for benign disease.      PAST MEDICAL HISTORY:   1.  Essential hypertension with no known cardiac problems.     2.  Major depressive disorder.   3.  History of a headache and migraines.      SOCIAL HISTORY:  The patient has never smoked.  She lives with her  and 4 daughters in [place].  Several alcoholic beverages weekly.  No illicit drug use.      FAMILY MEDICAL HISTORY:  Significant for her father who had previous heart attacks and cerebrovascular accident.  Her mother had several cancers including ovarian-renal-lymphoma.      PHYSICAL EXAMINATION:   GENERAL:  The  patient was examined in her room.  Her  and children were present.  She is very comfortable at this time and denies any significant abdominal pain.  Nausea and vomiting has resolved.   VITAL SIGNS:  Blood pressure 125/77, temperature 98.7, BMI 23.80 kg/m2.   HEENT:  Unremarkable with no jaundice.   CHEST:  Clear to auscultation.   CARDIOVASCULAR:  Regular rate without murmur.   ABDOMEN:  Mildly obese, soft, minimal tenderness with palpation in the right upper quadrant.  The rest of the abdomen is completely benign.   EXTREMITIES:  No distal edema.  Good distal pulses.      ADMISSION LABORATORY DATA:  Sodium 140, potassium 3.8, serum creatinine 0.80.  Albumin 4.2, bilirubin 0.5.  Other liver function tests including lipase are normal.  Negative serum hCG.  White blood count slightly elevated at 11,300 with a normal hemoglobin of 14.4.      I reviewed the CT scan of the abdomen which was initially performed in the Emergency Department which revealed a very dilated and swollen gallbladder.  No obvious stones were noted.  The common bile duct, intrahepatic ducts were normal as was the pancreas.  The rest of the abdominal organs including the spleen, kidneys and aorta were normal; previous hysterectomy and salpingo-oophorectomy.  No obvious abdominal wall hernias.      Gallbladder ultrasound was then performed.  This did reveal multiple gallstones adherent to the wall of the gallbladder which was thickened and swollen.  The common bile duct was of normal diameter.      IMPRESSION AND PLAN:  Acute cholecystitis secondary to cholelithiasis.  Very likely she had an obstructing stone that has now pulled back from the gallbladder and that is why her pain is better.  However, with the significant episode of pain which she had, in fact, the gallbladder was very swollen, at this point in time I feel that she would benefit from a cholecystectomy.  She has been given intravenous antibiotics and is now on IV fluids.  She  will be taken to the operating room tomorrow for laparoscopic cholecystectomy by my associate Dr. Francis Love.  I discussed the operative procedure at length.  There is a chance that an open cholecystectomy may be necessary, but I feel it is very unlikely since she has had only 1 episode of this problem and thus inflammation should be only mild.  She is also aware that there is a potential injury to the common bile duct, but again this would be quite unusual.  We discussed the postoperative recovery and return to normal activities.  She is aware that if she does eat very fatty food, post-procedure she has a good chance of developing diarrhea but typically people with gallbladders out eat a relatively normal diet and live a completely normal life.      I spent 30 minutes with the patient and her family today, with over 50% in counseling.  We plan to take her to the operating room tomorrow but we are on a weekend schedule and possibly that this may be delayed depending on the number of emergencies that occur.         JASON KELLY MD             D: 2017 18:15   T: 2017 19:55   MT:       Name:     HANNAH GIPSON   MRN:      [id number]        Account:       [id number]   :      1970           Consult Date:  2017      Document: [id number]

## 2017-09-10 NOTE — OP NOTE
General Surgery Operative Note    PREOPERATIVE DIAGNOSIS:  Acute cholecystitis    POSTOPERATIVE DIAGNOSIS:  Acute cholecystitis with hydrops gallbladder    PROCEDURE:   Procedure(s):  LAPAROSCOPIC CHOLECYSTECTOMY    ANESTHESIA:  General.    PREOPERATIVE MEDICATIONS:  Invanz IV.    SURGEON:  Francis Chapa MD    ASSISTANT:  NONE    INDICATIONS:  Patient presented yesterday with severe abdominal pain which had awoken her from sleep.  No previous episodes like this.  Was found to have a mild leukocytosis and a CT scan and ultrasound confirmed findings suggestive of acute cholecystitis.    PROCEDURE:  The patient was taken to the operating suite and uneventfully endotracheally intubated.  The abdomen was prepped and draped in a sterile fashion.  Surgeon initiated timeout was acknowledged.  We entered the abdomen in the left upper quadrant using Visiport technique.  Three other trocars were placed under laparoscopic visualization.  We elevated the liver and were able to identify a very inflamed and edematous gallbladder.  We used an aspiration needle to puncture the dome of the gallbladder and removed 60 cc of thick, clear mucoid fluid consistent with hydrops gallbladder.  The gallbladder was grasped and used to elevate the liver further.  We began dissecting out some fatty adhesions down near the neck of the gallbladder until a cystic duct was encountered.  We continued our dissection using combination of sharp and blunt dissection until the cystic duct was largely dissected out.  We continued our dissection up along the sides of the gallbladder, both medially and laterally, until we had created a space between the gallbladder and the liver.  At this point, we encountered the cystic artery, just posterior and lateral to the cystic duct.  This again was dissected out.  Once we had created a window where only the cystic artery and duct were noted to be entering the gallbladder, we felt that this represented our critical  view.  The cystic artery and duct were then doubly clipped and divided.  We continued our dissection up along the body of the gallbladder, freeing all attachments and adhesions of the gallbladder to the liver.  Gallbladder was removed from the liver in an atraumatic fashion.  The gallbladder was then brought up through the umbilical port site and removed from the abdomen.  The gallbladder fossa was reinspected, and all areas of bleeding were managed with electrocautery.  We irrigated the area with normal saline and aspirated it out.  We then removed the umbilical port trocar and closed the fascia with a figure-of-eight 0 Vicryl suture.  This was done using the Addison-Annelise device.  We then reinspected the abdomen, and everything appeared to be in pristine condition.  We removed the trocars under laparoscopic visualization and desufflated the abdomen with the Isreal suction .  The skin edges were reapproximated with 4-0 Vicryl and Steri-Strips.  The patient was uneventfully extubated, awakened and taken to the PACU in stable condition.  At the conclusion of the case, all lap and needle counts were correct.      ESTIMATED BLOOD LOSS:  10 mL    INTRAOPERATIVE FINDINGS:  Intensely inflamed and edematous gallbladder.  Tense and full of clear mucoid fluid consistent with hydrops gallbladder.    Francis Chpaa MD, MD

## 2017-09-10 NOTE — PLAN OF CARE
Problem: Goal Outcome Summary  Goal: Goal Outcome Summary  Outcome: Improving  DOS. A&Ox4. Up with SBA/A1. Tolerating clears. Pain controlled with IV dilaudid. Voiding in BSC. IV infusing. VSS on RA. CMS baseline. Dressings on abdomen CDI. Abdomen tender, mildly distended, active bowel sounds. Plan for possible DC home today. Wedding ring in patients bag of belongings per her request. Continue to monitor.

## 2017-09-10 NOTE — DISCHARGE SUMMARY
DATE OF ADMISSION:  2017      DATE OF DISCHARGE:  09/10/2017      HISTORY OF PRESENT ILLNESS:  Hannah Gipson is a 46-year-old patient who developed acute upper abdominal pain.  She was admitted to [facility].  Ultrasound revealed evidence of acute cholecystitis with cholelithiasis.      HOSPITAL COURSE:  The patient was admitted.  She was given IV hydration and intravenous narcotics which did improve her discomfort.  She was noted to have a markedly distended gallbladder on CT scan and ultrasound with multiple stones.  It was felt that she would be best treated with surgery.      Dr. Chapa performed a laparoscopic cholecystectomy early in the morning of 09/10/2017.  He found hydrops of the gallbladder inflammation with acute inflammation.  No other pathology was appreciated.  She tolerated the procedure quite well.      Postoperatively, the patient had significant improvement of her discomfort.  She was ready for discharge to home later on the first postoperative day with no evidence of complications.      FINAL DIAGNOSES:   1.  Acute cholecystitis with hydrops secondary to cholelithiasis, status post laparoscopic cholecystectomy.   2.  Hypertension.      DISCHARGE MEDICATIONS:   1.  Hydrochlorothiazide.   2.  Oxycodone p.r.n.   3.  Ibuprofen p.r.n.      PLAN:  The patient will discharge to home.  She will gradually increase her diet and activities.  She will follow up in our office in 2 weeks.         JASON KELLY MD             D: 09/10/2017 10:33   T: 09/10/2017 11:09   MT: LAURA      Name:     HANNAH GIPSON   MRN:      [id number]        Account:        [id number]   :      1970           Admit Date:                                       Discharge Date:       Document: [id number]       cc: [facility]

## 2017-09-10 NOTE — ANESTHESIA CARE TRANSFER NOTE
Patient: Gabbi Grady    Procedure(s):  LAPAROSCOPIC CHLOECYSTECTOMY - Wound Class: II-Clean Contaminated    Diagnosis: unknown  Diagnosis Additional Information: No value filed.    Anesthesia Type:   General, RSI, ETT     Note:  Airway :Face Mask  Patient transferred to:PACU  Comments: Neuromuscular blockade reversed after TOF 4/4, spontaneous respirations, adequate tidal volumes, followed commands to voice, oropharynx suctioned with soft flexible catheter, extubated atraumatically, extubated with suction, airway patent after extubation.  Oxygen via facemask at 8 liters per minute to PACU. Oxygen tubing connected to wall O2 in PACU, SpO2, NiBP, and EKG monitors and alarms on and functioning, Rivas Hugger warmer connected to patient gown, report on patient's clinical status given to PACU RN, RN questions answered.       Vitals: (Last set prior to Anesthesia Care Transfer)    CRNA VITALS  9/10/2017 0210 - 9/10/2017 0244      9/10/2017             Pulse: 91    SpO2: 93 %    Resp Rate (set): 10                Electronically Signed By: CARMELO Rico CRNA  September 10, 2017  2:44 AM

## 2017-09-10 NOTE — PLAN OF CARE
Problem: Goal Outcome Summary  Goal: Goal Outcome Summary  Outcome: No Change  Md here to see patient. Will do surgery tonight instead of tomorrow. Pre-op checklist done. NPO since 2100. Patient denies any questions or concerns at this time. Consent signed. Ready for surgery.

## 2017-09-10 NOTE — PROGRESS NOTES
Surgery Progress Note    S:  Comfortable.    O:   Vitals:  BP  Min: 110/63  Max: 155/96  Temp  Av.8  F (37.1  C)  Min: 97.7  F (36.5  C)  Max: 99.7  F (37.6  C)  I/O last 3 completed shifts:  In: 1800 [P.O.:200; I.V.:1600]  Out: 285 [Urine:275; Blood:10]    Physical Exam: Alert   Talkative   VSS                             Wd=A      Assessment/Plan: Doing well.   Home today.   RTO 2 weeks      Wm. Sola MD      Discharge Summary dictated.

## 2017-09-10 NOTE — IP AVS SNAPSHOT
MRN:[id number]                      After Visit Summary   9/10/2017    Gabbi Grady    MRN: [id number]           Thank you!     Thank you for choosing [facility] for your care. Our goal is always to provide you with excellent care. Hearing back from our patients is one way we can continue to improve our services. Please take a few minutes to complete the written survey that you may receive in the mail after you visit with us. Thank you!        Patient Information     Date Of Birth          1970        Designated Caregiver       Most Recent Value    Caregiver    Will someone help with your care after discharge? yes    Name of designated caregiver elizabeth    Phone number of caregiver [phone]    [address]iver [address]      About your hospital stay     You were admitted on:  September 10, 2017 You last received care in the:  [place] [address] [facility]    You were discharged on:  September 12, 2017        Reason for your hospital stay       Recheck after laparoscopic cholecystectomy with Dr. Chapa                  Who to Call     For medical emergencies, please call 911.  For non-urgent questions about your medical care, please call your primary care provider or clinic, [phone]          Attending Provider     Provider Specialty    Armani Torres MD --    Gera Selby MD Surgery    Sammi, Francis Galicia MD Surgery       Primary Care Provider Office Phone # Fax #    [facility] [phone]      After Care Instructions     Activity       Avoid strenuous activity or heavy lifting >20 pounds for 2-3 weeks.            Diet       Gradually resume your regular diet as tolerated.            Discharge Instructions       CONSTIPATION PREVENTION  We recommend that you go to a pharmacy and purchase ONE of the following stool softeners/laxatives and start taking today to prevent constipation. You can  stop this bowel regimen once you are no longer taking your narcotic pain medication or are having regular bowel movements:    - Senokot oral once daily  - Milk of magnesium once daily  - Docusate Sodium 1 pill twice daily (stool softener)  - Miralax 1 scoop/packet 1-2 times daily (laxative)    In addition to the above options, if constipation continues, you can add:   - 4 oz Prune juice or Plum juice daily for constipation            Wound care and dressings       Keep dressings clean and dry. Remove bandaids. Do not remove the small white steri strips over your incision. These will typically fall off on their own in 7-10 days. Do not attempt to replace these if they should fall off sooner. You can shower normally. You can allow warm water and soap to run over the incision. Do not scrub the incision. After showering pat your skin and steri strips dry. Do not submerge or soak your incisions under water for 2 weeks.                  Follow-up Appointments     Follow-up and recommended labs and tests        Please follow-up in 2 weeks in Dr. Chapa's office for your first postoperative appointment. Call [phone] to schedule.  Our clinic's name is [facility]. The address is [address] [place] [address] [address], [place], [place], [address]                  Pending Results     Date and Time Order Name Status Description    9/10/2017 0220 Surgical pathology exam In process             Statement of Approval     Ordered          09/12/17 0828  I have reviewed and agree with all the recommendations and orders detailed in this document.  EFFECTIVE NOW     Approved and electronically signed by:  Marisela Roach PA-C             Admission Information     Date & Time Provider Department Dept. Phone    9/10/2017 Francis Chapa MD [place] [address] [facility] [phone]      Your Vitals Were     Blood Pressure Pulse Temperature Respirations Height Weight    136/86 (BP Location: Right arm) 80 97.6  F  "(36.4  C) (Oral) 16 1.651 m (5' 5\") 78 kg (172 lb)    Last Period Pulse Oximetry BMI (Body Mass Index)             2005 99% 28.62 kg/m2         [employer] Information     [employer] lets you send messages to your doctor, view your test results, renew your prescriptions, schedule appointments and more. To sign up, go to [email or web address] . Click on \"Log in\" on the left side of the screen, which will take you to the Welcome page. Then click on \"Sign up Now\" on the right side of the page.     You will be asked to enter the access code listed below, as well as some personal information. Please follow the directions to create your username and password.     Your access code is: [email or web address]  Expires: 2017 10:35 AM     Your access code will  in 90 days. If you need help or a new code, please call your [facility] clinic or [phone].        Care EveryWhere ID     This is your Care EveryWhere ID. This could be used by other organizations to access your [facility] medical records  [id number]        Equal Access to Services     CALLIE MATAMOROS AH: Hadii man Zhao, waaxda lucornelio, qaybta kaalmada adekaleb, angelika vargas. So [facility] [phone].    ATENCIÓN: Si habla español, tiene a dahl disposición servicios gratuitos de asistencia lingüística. Brennon al [phone].    We comply with applicable federal civil rights laws and [place] laws. We do not discriminate on the basis of race, color, national origin, age, disability sex, sexual orientation or gender identity.               Review of your medicines      CONTINUE these medicines which have NOT CHANGED        Dose / Directions    aspirin-acetaminophen-caffeine 250-250-65 MG per tablet   Commonly known as:  EXCEDRIN MIGRAINE        Dose:  1-2 tablet   Take 1-2 tablets by mouth every 6 hours as needed for headaches   Refills:  0       estradiol 0.05 MG/24HR BIW patch   Commonly known as:  VIVELLE-DOT        Dose:  1 " patch   Place 1 patch onto the skin twice a week Sun/Thurs   Refills:  0       FLUOXETINE HCL PO        Dose:  60 mg   Take 60 mg by mouth daily Takes every other month, alternating with Sertraline   Refills:  0       hydrochlorothiazide 25 MG tablet   Commonly known as:  HYDRODIURIL   Used for:  Benign essential hypertension        Dose:  25 mg   Take 1 tablet (25 mg) by mouth daily   Quantity:  30 tablet   Refills:  0       LORAZEPAM PO        Dose:  0.5 mg   Take 0.5 mg by mouth daily as needed for anxiety   Refills:  0       oxyCODONE 5 MG IR tablet   Commonly known as:  ROXICODONE   Used for:  Acute cholecystitis        Dose:  5-10 mg   Take 1-2 tablets (5-10 mg) by mouth every 4 hours as needed for moderate to severe pain   Quantity:  25 tablet   Refills:  0       progesterone 200 MG capsule   Commonly known as:  PROMETRIUM        Dose:  200 mg   Take 200 mg by mouth At Bedtime   Refills:  0       SERTRALINE HCL PO        Dose:  200 mg   Take 200 mg by mouth daily Alternates Sertraline with Fluoxetine every other month   Refills:  0       WELLBUTRIN SR PO        Dose:  200 mg   Take 200 mg by mouth daily   Refills:  0       ZOLPIDEM TARTRATE PO        Dose:  10 mg   Take 10 mg by mouth nightly as needed for sleep   Refills:  0                Protect others around you: Learn how to safely use, store and throw away your medicines at www.disposemymeds.org.             Medication List: This is a list of all your medications and when to take them. Check marks below indicate your daily home schedule. Keep this list as a reference.      Medications           Morning Afternoon Evening Bedtime As Needed    aspirin-acetaminophen-caffeine 250-250-65 MG per tablet   Commonly known as:  EXCEDRIN MIGRAINE   Take 1-2 tablets by mouth every 6 hours as needed for headaches                                   estradiol 0.05 MG/24HR BIW patch   Commonly known as:  VIVELLE-DOT   Place 1 patch onto the skin twice a week Sun/Thurs    Next Dose Due:  Resume your home schedule                                  FLUOXETINE HCL PO   Take 60 mg by mouth daily Takes every other month, alternating with Sertraline   Next Dose Due:  09/12                                     hydrochlorothiazide 25 MG tablet   Commonly known as:  HYDRODIURIL   Take 1 tablet (25 mg) by mouth daily   Next Dose Due:  09/12                                   LORAZEPAM PO   Take 0.5 mg by mouth daily as needed for anxiety                                   oxyCODONE 5 MG IR tablet   Commonly known as:  ROXICODONE   Take 1-2 tablets (5-10 mg) by mouth every 4 hours as needed for moderate to severe pain                                   progesterone 200 MG capsule   Commonly known as:  PROMETRIUM   Take 200 mg by mouth At Bedtime   Last time this was given:  200 mg on 9/11/2017  9:26 PM   Next Dose Due:  09/12                                   SERTRALINE HCL PO   Take 200 mg by mouth daily Alternates Sertraline with Fluoxetine every other month   Next Dose Due:  09/12                                   WELLBUTRIN SR PO   Take 200 mg by mouth daily   Next Dose Due:  09/12                                   ZOLPIDEM TARTRATE PO   Take 10 mg by mouth nightly as needed for sleep

## 2017-09-10 NOTE — PROGRESS NOTES
Patient discharged at 12:40 PM to home. IV was discontinued. Pain at time of discharge was 3/10 - patient given ice for abdomen. Belongings returned to patient.  Discharge instructions and medications reviewed with patient.  Patient verbalized understanding and all questions were answered. At time of discharge, patient condition was stable and left the unit in wheelchair escorted by a [employer] volunteer.

## 2017-09-11 ENCOUNTER — APPOINTMENT (OUTPATIENT)
Dept: NUCLEAR MEDICINE | Facility: CLINIC | Age: 47
End: 2017-09-11
Attending: SURGERY
Payer: COMMERCIAL

## 2017-09-11 PROBLEM — G89.18 POST-OPERATIVE PAIN: Status: ACTIVE | Noted: 2017-09-11

## 2017-09-11 PROCEDURE — 25000128 H RX IP 250 OP 636: Performed by: SURGERY

## 2017-09-11 PROCEDURE — 99232 SBSQ HOSP IP/OBS MODERATE 35: CPT | Mod: 24 | Performed by: SURGERY

## 2017-09-11 PROCEDURE — 78226 HEPATOBILIARY SYSTEM IMAGING: CPT

## 2017-09-11 PROCEDURE — A9537 TC99M MEBROFENIN: HCPCS | Performed by: SURGERY

## 2017-09-11 PROCEDURE — 34300033 ZZH RX 343: Performed by: SURGERY

## 2017-09-11 PROCEDURE — 25000132 ZZH RX MED GY IP 250 OP 250 PS 637: Performed by: SURGERY

## 2017-09-11 PROCEDURE — G0378 HOSPITAL OBSERVATION PER HR: HCPCS

## 2017-09-11 PROCEDURE — 25000132 ZZH RX MED GY IP 250 OP 250 PS 637

## 2017-09-11 RX ORDER — KIT FOR THE PREPARATION OF TECHNETIUM TC 99M MEBROFENIN 45 MG/10ML
5 INJECTION, POWDER, LYOPHILIZED, FOR SOLUTION INTRAVENOUS ONCE
Status: COMPLETED | OUTPATIENT
Start: 2017-09-11 | End: 2017-09-11

## 2017-09-11 RX ORDER — LORAZEPAM 0.5 MG/1
0.5 TABLET ORAL DAILY PRN
Status: DISCONTINUED | OUTPATIENT
Start: 2017-09-11 | End: 2017-09-12 | Stop reason: HOSPADM

## 2017-09-11 RX ORDER — POTASSIUM CHLORIDE 1500 MG/1
20 TABLET, EXTENDED RELEASE ORAL DAILY
Status: DISCONTINUED | OUTPATIENT
Start: 2017-09-11 | End: 2017-09-12 | Stop reason: HOSPADM

## 2017-09-11 RX ORDER — ACETAMINOPHEN 325 MG/1
325 TABLET ORAL EVERY 4 HOURS PRN
Status: DISCONTINUED | OUTPATIENT
Start: 2017-09-11 | End: 2017-09-11 | Stop reason: DRUGHIGH

## 2017-09-11 RX ORDER — ACETAMINOPHEN 325 MG/1
TABLET ORAL
Status: COMPLETED
Start: 2017-09-11 | End: 2017-09-11

## 2017-09-11 RX ORDER — ACETAMINOPHEN 325 MG/1
650 TABLET ORAL EVERY 4 HOURS PRN
Status: DISCONTINUED | OUTPATIENT
Start: 2017-09-11 | End: 2017-09-11 | Stop reason: DRUGHIGH

## 2017-09-11 RX ORDER — HYDROCHLOROTHIAZIDE 25 MG/1
25 TABLET ORAL DAILY
Status: DISCONTINUED | OUTPATIENT
Start: 2017-09-11 | End: 2017-09-12 | Stop reason: HOSPADM

## 2017-09-11 RX ADMIN — MEBROFENIN 6.3 MILLICURIE: 45 INJECTION, POWDER, LYOPHILIZED, FOR SOLUTION INTRAVENOUS at 12:58

## 2017-09-11 RX ADMIN — SODIUM CHLORIDE AND POTASSIUM CHLORIDE: 9; 1.49 INJECTION, SOLUTION INTRAVENOUS at 01:19

## 2017-09-11 RX ADMIN — ACETAMINOPHEN 325 MG: 325 TABLET, FILM COATED ORAL at 09:23

## 2017-09-11 RX ADMIN — SODIUM CHLORIDE AND POTASSIUM CHLORIDE: 9; 1.49 INJECTION, SOLUTION INTRAVENOUS at 09:01

## 2017-09-11 RX ADMIN — SODIUM CHLORIDE AND POTASSIUM CHLORIDE: 9; 1.49 INJECTION, SOLUTION INTRAVENOUS at 17:22

## 2017-09-11 RX ADMIN — HYDROMORPHONE HYDROCHLORIDE 0.5 MG: 1 INJECTION, SOLUTION INTRAMUSCULAR; INTRAVENOUS; SUBCUTANEOUS at 01:21

## 2017-09-11 RX ADMIN — PROGESTERONE 200 MG: 100 CAPSULE ORAL at 21:26

## 2017-09-11 RX ADMIN — POTASSIUM CHLORIDE 20 MEQ: 1500 TABLET, EXTENDED RELEASE ORAL at 09:24

## 2017-09-11 ASSESSMENT — ACTIVITIES OF DAILY LIVING (ADL)
SWALLOWING: 0-->SWALLOWS FOODS/LIQUIDS WITHOUT DIFFICULTY
TOILETING: 0-->INDEPENDENT
TRANSFERRING: 0-->INDEPENDENT
RETIRED_COMMUNICATION: 0-->UNDERSTANDS/COMMUNICATES WITHOUT DIFFICULTY
COGNITION: 0 - NO COGNITION ISSUES REPORTED
BATHING: 0-->INDEPENDENT
RETIRED_EATING: 0-->INDEPENDENT
AMBULATION: 0-->INDEPENDENT
DRESS: 0-->INDEPENDENT
FALL_HISTORY_WITHIN_LAST_SIX_MONTHS: NO

## 2017-09-11 ASSESSMENT — ENCOUNTER SYMPTOMS
SHORTNESS OF BREATH: 0
COUGH: 0

## 2017-09-11 NOTE — PROGRESS NOTES
Surgery  Pt feels better, still some pain.  Mild bump in bili, LFTs.  Some fluid in RUQ, not unexpected  Will order HIDA scan.  If positive for bile leak, will get GI involved.  NPO until after scan.  Augusto Chapa MD  General Surgery, Office [phone]

## 2017-09-11 NOTE — ED NOTES
Patient just had arthroscopic removal of gall bladder here at 0130. She was discharged home around noon today. She is now having severe right shoulder and side pain.

## 2017-09-11 NOTE — PLAN OF CARE
Problem: Goal Outcome Summary  Goal: Goal Outcome Summary  Outcome: Improving  Patient is A&O, VSS on RA, CMS intact, NPO, IV infusing, up independently, IV dilaudid for pain control. Potassium level was 3.2, replacement ordered for  9am this morning. Will continue to monitor

## 2017-09-11 NOTE — PROGRESS NOTES
SURGERY    Gabbi Grady was discharged earlier today after undergoing Lap Ita early this AM for Hydrops. Straightforward case per Dr Chapa.    She has had worsening pain this PM not controllled by po meds.    Evaluated by Dr Torres.  Afebrile VSS  New elevation of TBili and LFTs.  Normal WBC    CT reviewed with fluid around GB fossa.      Impression:  Post op pain.  Some concern for Bile leak.     Per OP Note very good visualization of cystic duct prior to clipping thus CBD injury less likely. Minimal bleeding intraop and cystic artery easily clipped.    Admit with IV Dilaudid.  IV fluids.  ? Further studies in AM.      Gera Selby MD

## 2017-09-11 NOTE — PHARMACY-ADMISSION MEDICATION HISTORY
Admission medication history interview status for the 9/10/2017  admission is complete. See EPIC admission navigator for prior to admission medications     Medication history source reliability:Good    Actions taken by pharmacist (provider contacted, etc):None     Additional medication history information not noted on PTA med list :None  ---Pt discharged on 9/10 and med rec done on 9/9. Did not interview the pt, but used chart review to do the med rec.     Medication reconciliation/reorder completed by provider prior to medication history? No    Time spent in this activity: 7 min    Prior to Admission medications    Medication Sig Last Dose Taking? Auth Provider   oxyCODONE (ROXICODONE) 5 MG IR tablet Take 1-2 tablets (5-10 mg) by mouth every 4 hours as needed for moderate to severe pain  Yes Marisela Roach PA-C   hydrochlorothiazide (HYDRODIURIL) 25 MG tablet Take 1 tablet (25 mg) by mouth daily  Yes Armando Chairez MD   FLUOXETINE HCL PO Take 60 mg by mouth daily Takes every other month, alternating with Sertraline 8/31/2017 at Unknown time Yes Unknown, Entered By History   BuPROPion HCl (WELLBUTRIN SR PO) Take 200 mg by mouth daily 9/9/2017 at Unknown time Yes Unknown, Entered By History   aspirin-acetaminophen-caffeine (EXCEDRIN MIGRAINE) 250-250-65 MG per tablet Take 1-2 tablets by mouth every 6 hours as needed for headaches  Yes Unknown, Entered By History   LORAZEPAM PO Take 0.5 mg by mouth daily as needed for anxiety  Yes Unknown, Entered By History   progesterone (PROMETRIUM) 200 MG capsule Take 200 mg by mouth At Bedtime  Yes Unknown, Entered By History   SERTRALINE HCL PO Take 200 mg by mouth daily Alternates Sertraline with Fluoxetine every other month 9/9/2017 at Unknown time Yes Unknown, Entered By History   ZOLPIDEM TARTRATE PO Take 10 mg by mouth nightly as needed for sleep  Yes Unknown, Entered By History   estradiol (VIVELLE-DOT) 0.05 MG/24HR BIW patch Place 1 patch onto the skin twice a  week Sun/Thurs  Yes Unknown, Entered By History

## 2017-09-11 NOTE — PROGRESS NOTES
Surgery Progress Note    S:  Patient is now on the floor.  She was fine when she got home   However approximately 1800 hrs. She noted abdominal pain in the  Right upper quadrant that did not improve.  She received MS in the emergency department and feels much better  At this time.  No fever or chills.    O:   Vitals:  BP  Min: 110/63  Max: 179/85  Temp  Av.8  F (37.1  C)  Min: 97.7  F (36.5  C)  Max: 99.7  F (37.6  C)  Pulse  Av.5  Min: 83  Max: 94       Physical Exam:  Very comfortable and talkative.  Chest= clear                               Abdomen= distended, bowel sounds are present,                                               Mild right upper quadrant tenderness      Elevated bilirubin and liver  Function tests  .  CT with fluid around the gallbladder bed and liver.    Assessment/Plan:   Post laparoscopic cholecystectomy pain.  Uncomplicated surgery with good visualization.  Possibility of a stone passed into the common duct with manipulation of the gallbladder could explain discomfort and liver function tests elevation.  However, this would not explain the fluid collection.                                     Oral potassium replacement for potassium=3.2      Wm. MD Sola

## 2017-09-11 NOTE — ED NOTES
[facility]  ED Nurse Handoff Report    ED Chief complaint: Abdominal Pain      ED Diagnosis:   Final diagnoses:   Elevated LFTs   Acute post-operative pain   Post-op pain       Code Status: Full Code    Allergies:   Allergies   Allergen Reactions     Penicillins        Activity level - Baseline/Home:  Independent    Activity Level - Current:   Independent     Needed?: No    Isolation: No  Infection: Not Applicable    Bariatric?: No    Vital Signs:   Vitals:    09/10/17 2200 09/10/17 2230 09/10/17 2245 09/10/17 2249   BP:    134/84   Pulse:       Resp:       Temp:       TempSrc:       SpO2: 97% 96% 94% 95%   Weight:       Height:           Cardiac Rhythm: ,        Pain level: 0-10 Pain Scale: 6    Is this patient confused?: No    Patient Report: Initial Complaint: Abdominal   Focused Assessment: Respiratory- WDL; Cardiac- WDL NSR per EKG; Neuro- WDL; Gastrointestinal- Right shoulder pain radiating down to her abdomen.  Tests Performed: Labs; EKG; CT;  Abnormal Results: K 3.2; Alkaline phosphate- 307; ALT-332; AST-387; Bilirubin- 1.4;  Treatments provided: None  Family Comments:  at bedside, in the ED but went home.    OBS brochure/video discussed/provided to patient: N/A    ED Medications:   Medications   naloxone (NARCAN) injection 0.1-0.4 mg (not administered)   0.9% sodium chloride + KCl 20 mEq/L infusion (not administered)   oxyCODONE (ROXICODONE) IR tablet 5-10 mg (not administered)   HYDROmorphone (PF) (DILAUDID) injection 0.3-0.5 mg (not administered)   ondansetron (ZOFRAN-ODT) ODT tab 4 mg (not administered)     Or   ondansetron (ZOFRAN) injection 4 mg (not administered)   prochlorperazine (COMPAZINE) injection 5-10 mg (not administered)     Or   prochlorperazine (COMPAZINE) tablet 5-10 mg (not administered)     Or   prochlorperazine (COMPAZINE) Suppository 25 mg (not administered)   morphine (PF) injection 4 mg (4 mg Intravenous Given 9/10/17 2154)   ondansetron (ZOFRAN)  injection 4 mg (4 mg Intravenous Given 9/10/17 2154)   iopamidol (ISOVUE-370) solution 86 mL (86 mLs Intravenous Given 9/10/17 2304)   CT scan flush (80 mLs Intravenous Given 9/10/17 2305)       Drips infusing?:  No      ED NURSE PHONE NUMBER: [phone]

## 2017-09-11 NOTE — PROGRESS NOTES
SURGERY    Hepatobiliary scan is negative with no evidence of bile leak.  Patient informed.  Discharge when she is feeling better.         Gera Selby MD

## 2017-09-11 NOTE — ED PROVIDER NOTES
"  History     Chief Complaint:  Abdominal pain    HPI   Gabbi Grady is a 46 year old female with a recent cholecystectomy who presents with abdominal pain. The patient had a cholecystectomy last night (see below). The patient reports that she was discharged this afternoon and has radiating pain from her shoulder down through her abdomen. She presents to the emergency department because she \"can't handle it anymore.\" The patient has been taking her prescribed Codeine today; her  reports that she took it at 1415, 1615 and around 2030 with a Tylenol at 1500. The patient denies nausea, vomiting, fever, and having any bowel movement or passing gas today.    [facility]  Gera Selby MD   DATE OF ADMISSION:  09/09/2017    DATE OF DISCHARGE:  09/10/2017     Dr. Chapa performed a laparoscopic cholecystectomy early in the morning of 09/10/2017.  He found hydrops of the gallbladder inflammation with acute inflammation.  No other pathology was appreciated.  She tolerated the procedure quite well.       Postoperatively, the patient had significant improvement of her discomfort.  She was ready for discharge to home later on the first postoperative day with no evidence of complications.       FINAL DIAGNOSES:   1.  Acute cholecystitis with hydrops secondary to cholelithiasis, status post laparoscopic cholecystectomy.   2.  Hypertension.       DISCHARGE MEDICATIONS:   1.  Hydrochlorothiazide.   2.  Oxycodone p.r.n.   3.  Ibuprofen p.r.n.       PLAN:  The patient will discharge to home.  She will gradually increase her diet and activities.  She will follow up in our office in 2 weeks.       Allergies:  Penicillins     Medications:    Oxycodone  Hydrodiuril  Fluoxetine  Wellbutrin  Excedrin Migraine  Lorazepam  Prometrium  Sertraline HCl  Zolpidem Tartrate   Vivelle-DOT    Past Medical History:    Cholecystitis   Depressive disorder  Headache  Hypertension  Menorrhagia  Urticaria    Past " "Surgical History:    Laprascopy x 2  C Section x 3  HC Hysteroscopy w. Endometrial Ablation  HC Trachelorrhaphy, Plastic repair uterine cervix, Vag approach x 2  [place], Cervical    Family History:    History reviewed. No pertinent family history.     Social History:  The patient was accompanied to the ED by her .  Smoking Status: Never  Smokeless Tobacco: Never  Alcohol Use: Yes   Marital Status:       Review of Systems   Constitutional: Negative for fever.   Respiratory: Negative for cough and shortness of breath.    Cardiovascular: Negative for chest pain.   Gastrointestinal: Positive for abdominal pain. Negative for nausea and vomiting.        No bowel movements, hasn't passed gas     Musculoskeletal: Positive for arthralgias (Shoulder).   All other systems reviewed and are negative.    Physical Exam   First Vitals:  BP: 179/85  Pulse: 94  Temp: 98  F (36.7  C)  Resp: 20  Height: 165.1 cm (5' 5\")  Weight: 78 kg (172 lb)  SpO2: 98 %    Physical Exam  General: Appears somewhat uncomfortable.  Head: No signs of trauma.   Mouth/Throat: Oropharynx is clear and moist.   CV: Normal rate and regular rhythm.    Resp: Effort normal and breath sounds normal. No respiratory distress.   GI: Soft. There is RUQ tenderness.  Normal bowel sounds.  No CVA tenderness.  MSK: Normal range of motion. no edema. No Calf tenderness.  Neuro: The patient is alert and oriented.  Speech normal.  GCS 15  Skin: Skin is warm and dry. Abdominal incisions intact without drainage.  Psych: normal mood and affect. behavior is normal.     Emergency Department Course     ECG:  Indication: abdominal pain  Completed at 2149.  Read at 2150.   Normal sinus rhythm  Normal ECG   Rate 76 bpm. OR interval 126. QRS duration 92. QT/QTc 410/461. P-R-T axes 25 52 40.    Imaging:  Radiology findings were communicated with the patient and family who voiced understanding of the findings.  CT Abdomen Pelvis w Contrast  IMPRESSION:  1. Probable " hematoma in the gallbladder fossa.  2. There is fluid extending inferiorly from the gallbladder fossa as  well as a small amount of ascites. Bile leak is a possibility.  3. Trace right pleural effusion and bibasilar atelectasis.  4. The urinary bladder is very distended, but otherwise appears  Normal.  Report per radiology     Laboratory:  Laboratory findings were communicated with the patient and family who voiced understanding of the findings.  CBC:  AWNL. (WBC 10.3, HGB 14.3, )   CMP: Potassim 3.2 (L), glucose 125 (H), BUN 6 (L), Bilirubin total 1.4 (H), ALKPHOS 307 (H),  (H),  (H) o/w WNL (Creatinine 0.85)    Interventions:  2154 - Zofran 4mg IV  2154 - Morphine 4mg IV     Emergency Department Course:  Nursing notes and vitals reviewed.  IV was inserted and blood was drawn for laboratory testing, results above.  The patient was sent for a CT Abdomen Pelvis w Contrast while in the emergency department, results above.   2130: I performed an exam of the patient as documented above.   2334:I spoke with Dr. Selby of the General Surgery service regarding patient's presentation, findings, and plan of care.  I discussed the treatment plan with the patient. They expressed understanding of this plan and consented to admission. I discussed the patient with Dr. Selby, who will admit the patient to a monitored bed for further evaluation and treatment.    I personally reviewed the laboratory and imaging results with the Patient and spouse and answered all related questions prior to admission.    Impression & Plan      Medical Decision Making:  Gabbi Grady is a 46-year-old woman who presents due to abdominal pain.  She had her gallbladder removed in the middle the night last night and was discharged in the day today.  She apparently had been doing well until this evening when she had sudden worsening pain.  On my evaluation, she did have tenderness to the right upper quadrant.  The abdominal  incisions appeared intact without concerning findings.  I did obtain blood work which showed some elevation of her bilirubin and LFTs.  I also obtained a CT scan showed concerns for the possibility of a bile leak and small hematoma and gallbladder fossa.  I spoke with Dr. Selby, who reviewed the patient's workup, and agreed with admitting the patient for pain control and monitoring overnight.  Patient's pain was well controlled during her time in the ER.    Diagnosis:    ICD-10-CM    1. Elevated LFTs R79.89    2. Acute post-operative pain G89.18    3. Post-op pain G89.18      Disposition:  Admitted to Surgical by Dr. Selby.      Scribe Disclosure:  I, Kelly Thomason, am serving as a scribe at 9:30 PM on 9/10/2017 to document services personally performed by Armani Torres MD based on my observations and the provider's statements to me.   9/10/2017    EMERGENCY DEPARTMENT       Armani Torres MD  09/11/17 0046

## 2017-09-11 NOTE — PROGRESS NOTES
Surgery Progress Note    S: Less sore this AM.  Notes right shoulder pain- less abdominal pain.    O:   Vitals:  BP  Min: 111/67  Max: 179/85  Temp  Av.3  F (36.8  C)  Min: 98  F (36.7  C)  Max: 98.7  F (37.1  C)  Pulse  Av.5  Min: 83  Max: 94  I/O last 3 completed shifts:  In: 585.42 [I.V.:585.42]  Out: -     Physical Exam: Comfortable  No fever since admit                              Abd=distended, + BS        CT with some fluid in GB bed and over liver ( ? Cause of pain)            No significant bleeding  (can always have some bleeding from GB bed-             Usually always clot--as noted here)      Assessment/Plan:  Dr Chapa will re-evaluate patient.      Wm. Sola MD

## 2017-09-11 NOTE — PLAN OF CARE
Problem: Goal Outcome Summary  Goal: Goal Outcome Summary  Outcome: Improving  VSS, A&O, Lung sounds clear, bowel sounds hypoactive, lap sites with steri strips, ecchymotic. RUQ tenderness. Pain 4/10 but declines pain meds. NPO. Down to nuclear meds for HIDA scan. Ambulates independently.

## 2017-09-11 NOTE — PROGRESS NOTES
September 11, 2017  Patient is A&O, VSS on RA, CMS intact, NPO, IV infusing, up independently, but calls appropriately to disconnect IV from wall and PCDs. Amy Palmer RN, ONC

## 2017-09-11 NOTE — PROGRESS NOTES
"September 11, 2017  Tylenol given for c/o slight headache, rating \"4\" w/ sips of water. Up w/  assist to bathroom to void, tolerated well. Report called to [facility] and transfer to follow. Amy Palmer RN, ONC    "

## 2017-09-11 NOTE — UTILIZATION REVIEW
"[facility]     Admission Status; Secondary Review Determination     Admission Date: 9/10/2017  9:19 PM      Under the authority of the Utilization Management Committee, the utilization review process indicated a secondary review on the above patient.  The review outcome is based on review of the medical records, discussions with staff, and applying clinical experience noted on the date of the review.          (x) Observation Status Appropriate - This patient does not meet hospital inpatient criteria and is placed in observation status. If this patient's primary payer is Medicare and was admitted as an inpatient, Condition Code 44 should be used and patient status changed to \"observation\".     RATIONALE FOR DETERMINATION   46 year old female had a recent cholecystectomy and returned to the ED with abdominal and shoulder pain. CT showed some fluid over the gallbladder bed and liver but no bleeding. She is feeling better today, only required one dose of IV dilaudid. HIDA scan is pending to evaluated for bile leak. At the time of this review, the patient does not meet medical necessity for inpatient hospitalization.     The severity of illness, intensity of service provided, expected LOS and risk for adverse outcome make the care appropriate for further observation; however, doesn't meet criteria for hospital inpatient admission. Dr Chapa is in the OR and unable to discuss the case at this time but has been paged with UR contact information to discuss the case.         The information on this document is developed by the utilization review team in order for the business office to ensure compliance.  This only denotes the appropriateness of proper admission status and does not reflect the quality of care rendered.         The definitions of Inpatient Status and Observation Status used in making the determination above are those provided in the CMS Coverage Manual, Chapter 1 and Chapter 6, section 70.4. "      Sincerely,     Armani Bustamante DO MPH   Physician Advisor  Utilization Review  [facility]

## 2017-09-12 VITALS
BODY MASS INDEX: 28.66 KG/M2 | OXYGEN SATURATION: 99 % | WEIGHT: 172 LBS | HEART RATE: 80 BPM | SYSTOLIC BLOOD PRESSURE: 136 MMHG | DIASTOLIC BLOOD PRESSURE: 86 MMHG | RESPIRATION RATE: 16 BRPM | TEMPERATURE: 97.6 F | HEIGHT: 65 IN

## 2017-09-12 LAB
ALBUMIN SERPL-MCNC: 3.2 G/DL (ref 3.4–5)
ALP SERPL-CCNC: 264 U/L (ref 40–150)
ALT SERPL W P-5'-P-CCNC: 285 U/L (ref 0–50)
ANION GAP SERPL CALCULATED.3IONS-SCNC: 10 MMOL/L (ref 3–14)
AST SERPL W P-5'-P-CCNC: 129 U/L (ref 0–45)
BILIRUB SERPL-MCNC: 0.7 MG/DL (ref 0.2–1.3)
BUN SERPL-MCNC: 5 MG/DL (ref 7–30)
CALCIUM SERPL-MCNC: 8.4 MG/DL (ref 8.5–10.1)
CHLORIDE SERPL-SCNC: 111 MMOL/L (ref 94–109)
CO2 SERPL-SCNC: 24 MMOL/L (ref 20–32)
COPATH REPORT: NORMAL
CREAT SERPL-MCNC: 0.65 MG/DL (ref 0.52–1.04)
GFR SERPL CREATININE-BSD FRML MDRD: >90 ML/MIN/1.7M2
GLUCOSE SERPL-MCNC: 94 MG/DL (ref 70–99)
POTASSIUM SERPL-SCNC: 3.4 MMOL/L (ref 3.4–5.3)
PROT SERPL-MCNC: 6.1 G/DL (ref 6.8–8.8)
SODIUM SERPL-SCNC: 145 MMOL/L (ref 133–144)

## 2017-09-12 PROCEDURE — G0378 HOSPITAL OBSERVATION PER HR: HCPCS

## 2017-09-12 PROCEDURE — 36415 COLL VENOUS BLD VENIPUNCTURE: CPT | Performed by: SURGERY

## 2017-09-12 PROCEDURE — 80053 COMPREHEN METABOLIC PANEL: CPT | Performed by: SURGERY

## 2017-09-12 NOTE — PLAN OF CARE
Problem: Individualization  Goal: Patient Preferences     Discharge to home. Will follow up with dr Chapa in 2 weeks as instructed. Discharge education/medications complete. Transport arranged with .

## 2017-09-12 NOTE — PLAN OF CARE
Problem: Goal Outcome Summary  Goal: Goal Outcome Summary  Outcome: Improving  No pain meds given all shift (1900-0730). Ecchymotic area above the umbilicus softer this am than earlier in the shift. Up ad rene, voiding well. mReady for discharge today

## 2017-09-14 ENCOUNTER — TELEPHONE (OUTPATIENT)
Dept: OTHER | Facility: CLINIC | Age: 47
End: 2017-09-14

## 2017-09-14 NOTE — TELEPHONE ENCOUNTER
Patient Name: Gabbi Grady  Today's Date: 09/14/17    Called patient for follow-up after hospital stay and to discuss her CT results. CT while at [facility] revealed a few small lung nodules that are likely low risk and may not need imaging surveillance. However, I wanted to call the patient to ensure that these results were communicated with her and that she has a plan to follow-up with her PCP regarding these. She did not answer, I left message to call our office back.      Marisela Roach PA-C  [facility]  [phone]

## 2017-09-14 NOTE — DISCHARGE SUMMARY
Discharge Summary    Gabbi Grady MRN# [id number]   YOB: 1970 Age: 46 year old     Date of Admission:  9/10/2017  Date of Discharge:  9/12/2017 10:27 AM  Admitting Physician:  Francis Chapa MD  Discharging Service:  [facility] General Surgery   Primary Provider: Clinic, [facility]          Discharge Diagnoses:   Principle Diagnosis:  Post-op pain [G89.18]  Elevated LFTs [R79.89]           Brief HPI   Gabbi Grady was discharged on 9/10/17 from [facility] after undergoing laparoscopic cholecystectomy with Dr. Chapa. She returned to [facility] ED later on 9/10/17 with worsening pain not controlled by PO meds. She was examined by Dr. Torres and was found to have a mild elevation of her total bilirubin and elevation of her alk phos, ALT and AST. CT revealed possible hematoma in the gallbladder fossa, fluid extending from the gallbladder fossa and around the liver margin. She was admitted by Dr. Selby for observation, pain control, fluids and further workup.          Hospital Course   The patient's hospital course was overall unremarkable. Her pain improved rapidly with IV pain medications. By 9/11/17, less than 12 hours after her presentation to the ED, she was feeling quite comfortable. On 9/11 the patient underwent HIDA scan which was negative without evidence for bile leak or obstruction. Her total bilirubin also normalized and her LFTs trended down on 9/12. By 9/12/17 she had no concerning clinical findings, had stable vital signs, was afebrile, was tolerating a diet and had adequate pain control with oral medications. She was therefore appropriate for discharge home. She was instructed to follow-up with the surgical team in 2 weeks.         Consultations:   No consultations were requested during this admission         Procedures / Labs / Imaging:   CT CHEST/ABDOMEN/PELVIS W CONTRAST  9/10/2017 11:07 PM         IMPRESSION:  1. Probable hematoma in the gallbladder fossa.  2.  "There is fluid extending inferiorly from the gallbladder fossa as  well as a small amount of ascites. Bile leak is a possibility.  3. Trace right pleural effusion and bibasilar atelectasis.  4. The urinary bladder is very distended, but otherwise appears  normal.  5. Few small lung nodules. Comparison to any old exam or follow-up is  recommended.     NUCLEAR MEDICINE HEPATOBILIARY SCAN 9/11/2017 2:06 PM       IMPRESSION: Unremarkable hepatobiliary scan without evidence for bile  leak or obstruction.     CMP 9/12/17  Sodium 145 mmol/L   Potassium 3.4 mmol/L   Chloride 111 mmol/L   Carbon Dioxide 24 mmol/L   Anion Gap 10 mmol/L   Glucose 94 mg/dL   Urea Nitrogen 5 mg/dL   Creatinine 0.65 mg/dL   GFR Estimate >90 mL/min/1.7m2   GFR Estimate If Black >90 mL/min/1.7m2   Calcium 8.4 mg/dL   Bilirubin Total 0.7 mg/dL   Albumin 3.2 g/dL   Protein Total 6.1 g/dL   Alkaline Phosphatase 264 U/L    U/L    U/L     CMP 9/10/17  Sodium 140 mmol/L   Potassium 3.2 mmol/L   Chloride 104 mmol/L   Carbon Dioxide 27 mmol/L   Anion Gap 9 mmol/L   Glucose 125 mg/dL   Urea Nitrogen 6 mg/dL   Creatinine 0.85 mg/dL   GFR Estimate 71 mL/min/1.7m2   GFR Estimate If Black 86 mL/min/1.7m2   Calcium 8.6 mg/dL   Bilirubin Total 1.4 mg/dL   Albumin 4.1 g/dL   Protein Total 7.2 g/dL   Alkaline Phosphatase 307 U/L    U/L    U/L                Discharge Disposition:   Discharged to home          Condition on Discharge:   Discharge condition: Stable   Discharge vitals: Blood pressure 136/86, pulse 80, temperature 97.6  F (36.4  C), temperature source Oral, resp. rate 16, height 1.651 m (5' 5\"), weight 78 kg (172 lb), last menstrual period 01/17/2005, SpO2 99 %.          Discharge Medications:     Discharge Medication List as of 9/12/2017  9:09 AM      CONTINUE these medications which have NOT CHANGED    Details   oxyCODONE (ROXICODONE) 5 MG IR tablet Take 1-2 tablets (5-10 mg) by mouth every 4 hours as needed for moderate to " severe pain, Disp-25 tablet, R-0, Local Print      hydrochlorothiazide (HYDRODIURIL) 25 MG tablet Take 1 tablet (25 mg) by mouth daily, Disp-30 tablet, No Print Out      FLUOXETINE HCL PO Take 60 mg by mouth daily Takes every other month, alternating with Sertraline, Historical      BuPROPion HCl (WELLBUTRIN SR PO) Take 200 mg by mouth daily, Historical      aspirin-acetaminophen-caffeine (EXCEDRIN MIGRAINE) 250-250-65 MG per tablet Take 1-2 tablets by mouth every 6 hours as needed for headaches, Historical      LORAZEPAM PO Take 0.5 mg by mouth daily as needed for anxiety, Historical      progesterone (PROMETRIUM) 200 MG capsule Take 200 mg by mouth At Bedtime, Historical      SERTRALINE HCL PO Take 200 mg by mouth daily Alternates Sertraline with Fluoxetine every other month, Historical      ZOLPIDEM TARTRATE PO Take 10 mg by mouth nightly as needed for sleep, Historical      estradiol (VIVELLE-DOT) 0.05 MG/24HR BIW patch Place 1 patch onto the skin twice a week Sun/Thurs, Historical                  Discharge Instructions and Follow-Up:   Follow-up Appointments     Follow-up and recommended labs and tests        Please follow-up in 2 weeks in Dr. Chapa's office for your first   postoperative appointment. Call [phone] to schedule.  Our clinic's   name is [facility]. The address is [address] [place] [address] [address], [facility]   [address] [place] [address], [address]                  After Care Instructions     Activity       Avoid strenuous activity or heavy lifting >20 pounds for 2-3 weeks.            Diet       Gradually resume your regular diet as tolerated.            Discharge Instructions       CONSTIPATION PREVENTION  We recommend that you go to a pharmacy and purchase ONE of the following stool softeners/laxatives and start taking today to prevent constipation. You can stop this bowel regimen once you are no longer taking your narcotic pain medication or are having regular bowel movements:    - Senokot oral once daily  - Milk  of magnesium once daily  - Docusate Sodium 1 pill twice daily (stool softener)  - Miralax 1 scoop/packet 1-2 times daily (laxative)    In addition to the above options, if constipation continues, you can add:   - 4 oz Prune juice or Plum juice daily for constipation            Wound care and dressings       Keep dressings clean and dry. Remove bandaids. Do not remove the small white steri strips over your incision. These will typically fall off on their own in 7-10 days. Do not attempt to replace these if they should fall off sooner. You can shower normally. You can allow warm water and soap to run over the incision. Do not scrub the incision. After showering pat your skin and steri strips dry. Do not submerge or soak your incisions under water for 2 weeks.                      *I did not personally see or examine this patient at any point during her hospitalization. Discharge instructions and discharge summary were created utilizing chart review. Incidental finding of a few small lung nodules on CT noted. I will call the patient to ensure she was made aware of this and follows up with her primary care provider for any additional imaging.    Marisela Roach PA-C  [facility]  [phone]

## 2017-09-15 ENCOUNTER — TELEPHONE (OUTPATIENT)
Dept: SURGERY | Facility: CLINIC | Age: 47
End: 2017-09-15

## 2017-09-15 NOTE — TELEPHONE ENCOUNTER
Patient Name: Gabbi Grady  Today's Date: 09/15/17    Called patient, she is doing very well after her hospital stay and has no concerns. Pain is well-controlled. I discussed the findings of the lung nodules with her and recommended that she call or make an appointment with her primary care provider sometime in the next month, non-emergent, to review that CT scan and see if her PCP has any recommendations for follow-up or repeat imaging. She voiced understanding of this and plans to follow-up with her primary care provider regarding these lung nodules. She has no history of smoking. She will return for postop check in approximately 2 weeks. She appreciated the phone call and will let us know if she has any questions or concerns in the meantime.       Marisela Roach PA-C  [facility]  [phone]

## 2017-09-27 ENCOUNTER — OFFICE VISIT (OUTPATIENT)
Dept: SURGERY | Facility: CLINIC | Age: 47
End: 2017-09-27
Payer: COMMERCIAL

## 2017-09-27 VITALS — HEART RATE: 75 BPM | DIASTOLIC BLOOD PRESSURE: 91 MMHG | SYSTOLIC BLOOD PRESSURE: 141 MMHG

## 2017-09-27 DIAGNOSIS — Z09 SURGERY FOLLOW-UP: Primary | ICD-10-CM

## 2017-09-27 PROCEDURE — 99024 POSTOP FOLLOW-UP VISIT: CPT | Performed by: PHYSICIAN ASSISTANT

## 2017-09-27 NOTE — PROGRESS NOTES
[facility] Clinic Note     Subjective:  Gabbi Grady is here for her first postoperative visit. She underwent a cholecystectomy by Dr. Chapa  on September / 10 / 2017. Today she tells me she is doing well.  She is eating a normal diet and her bowels are regular. She currently does not need any pain medications.  She has no concerns today.    Objective:  Abd - Abdomen soft, non-tender.    Inc - Healing well and without signs of infection    Assessment:  S/p Laparoscopic cholecystectomy.     Plan:  Patient was instructed to call if fever, increasing pain, redness or drainage at the incision sites occurs.  Otherwise she will follow up with Clinic, [facility] for her regular medical needs.    Jocelyne Darling PA-C    Please route or send letter to:  *None*

## 2017-09-27 NOTE — MR AVS SNAPSHOT
"              After Visit Summary   2017    Gabbi Grady    MRN: [id number]           Patient Information     Date Of Birth          1970        Visit Information        Provider Department      2017 1:15 PM Jocelyne Darling PA-C [facility] General Surgery      Today's Diagnoses     Surgery follow-up    -  1       Follow-ups after your visit        Who to contact     If you have questions or need follow up information about today's clinic visit or your schedule please contact [facility] directly at [phone].  Normal or non-critical lab and imaging results will be communicated to you by [employer], letter or phone within 4 business days after the clinic has received the results. If you do not hear from us within 7 days, please contact the clinic through [employer] or phone. If you have a critical or abnormal lab result, we will notify you by phone as soon as possible.  Submit refill requests through [employer] or call your pharmacy and they will forward the refill request to us. Please allow 3 business days for your refill to be completed.          Additional Information About Your Visit        MyChart Information     [employer] lets you send messages to your doctor, view your test results, renew your prescriptions, schedule appointments and more. To sign up, go to [email or web address] . Click on \"Log in\" on the left side of the screen, which will take you to the Welcome page. Then click on \"Sign up Now\" on the right side of the page.     You will be asked to enter the access code listed below, as well as some personal information. Please follow the directions to create your username and password.     Your access code is: [email or web address]  Expires: 2017 10:35 AM     Your access code will  in 90 days. If you need help or a new code, please call your [place] clinic or [phone].        Care EveryWhere ID     This is " your Care EveryWhere ID. This could be used by other organizations to access your [facility] medical records  [id number]        Your Vitals Were     Pulse Last Period                75 01/17/2005           Blood Pressure from Last 3 Encounters:   09/27/17 (!) 141/91   09/12/17 136/86   09/10/17 111/67    Weight from Last 3 Encounters:   09/10/17 78 kg (172 lb)   06/23/05 64.9 kg (143 lb)   01/31/05 64.4 kg (142 lb)              Today, you had the following     No orders found for display       Primary Care Provider Office Phone # Fax #    [facility] [phone]       [address] [facility] [address]        Equal Access to Services     CALLIE MATAMOROS : Trey connollyo Soradha, waaxda luqadaha, qaybta kaalmada adeegyada, angelika shanks . So [facility] [phone].    ATENCIÓN: Si habla español, tiene a dahl disposición servicios gratuitos de asistencia lingüística. [facility] [phone].    We comply with applicable federal civil rights laws and [place] laws. We do not discriminate on the basis of race, color, national origin, age, disability sex, sexual orientation or gender identity.            Thank you!     Thank you for choosing [facility]  for your care. Our goal is always to provide you with excellent care. Hearing back from our patients is one way we can continue to improve our services. Please take a few minutes to complete the written survey that you may receive in the mail after your visit with us. Thank you!             Your Updated Medication List - Protect others around you: Learn how to safely use, store and throw away your medicines at www.disposemymeds.org.          This list is accurate as of: 9/27/17  1:48 PM.  Always use your most recent med list.                   Brand Name Dispense Instructions for use Diagnosis    aspirin-acetaminophen-caffeine 250-250-65 MG per tablet    EXCEDRIN MIGRAINE     Take 1-2 tablets  by mouth every 6 hours as needed for headaches        estradiol 0.05 MG/24HR BIW patch    VIVELLE-DOT     Place 1 patch onto the skin twice a week Sun/Thurs        FLUOXETINE HCL PO      Take 60 mg by mouth daily Takes every other month, alternating with Sertraline        hydrochlorothiazide 25 MG tablet    HYDRODIURIL    30 tablet    Take 1 tablet (25 mg) by mouth daily    Benign essential hypertension       LORAZEPAM PO      Take 0.5 mg by mouth daily as needed for anxiety        oxyCODONE 5 MG IR tablet    ROXICODONE    25 tablet    Take 1-2 tablets (5-10 mg) by mouth every 4 hours as needed for moderate to severe pain    Acute cholecystitis       progesterone 200 MG capsule    PROMETRIUM     Take 200 mg by mouth At Bedtime        SERTRALINE HCL PO      Take 200 mg by mouth daily Alternates Sertraline with Fluoxetine every other month        WELLBUTRIN SR PO      Take 200 mg by mouth daily        ZOLPIDEM TARTRATE PO      Take 10 mg by mouth nightly as needed for sleep

## (undated) DEVICE — SUCTION IRR STRYKERFLOW II W/TIP 250-070-520

## (undated) DEVICE — SU VICRYL 4-0 PS-2 18" UND J496H

## (undated) DEVICE — ESU CORD MONOPOLAR 10'  E0510

## (undated) DEVICE — SOL NACL 0.9% INJ 1000ML BAG 2B1324X

## (undated) DEVICE — GLOVE PROTEXIS W/NEU-THERA 7.5  2D73TE75

## (undated) DEVICE — SOL NACL 0.9% IRRIG 1000ML BOTTLE 2F7124

## (undated) DEVICE — ESU GROUND PAD UNIVERSAL W/O CORD

## (undated) DEVICE — DEVICE SUTURE GRASPER TROCAR CLOSURE 14GA PMITCSG

## (undated) DEVICE — ENDO TROCAR OPTICAL 05MM VERSAPORT PLUS W/FIX CAN ONB5STF

## (undated) DEVICE — PREP CHLORAPREP 26ML TINTED ORANGE  260815

## (undated) DEVICE — GLOVE PROTEXIS BLUE W/NEU-THERA 7.5  2D73EB75

## (undated) DEVICE — DRSG STERI STRIP 1/2X4" R1547

## (undated) DEVICE — ESU HOLDER LAP INST DISP PURPLE LONG 330MM H-PRO-330

## (undated) DEVICE — LINEN TOWEL PACK X5 5464

## (undated) DEVICE — ENDO TROCAR OPTICAL 11MM VERSAPORT PLUS W/FIX CAN ONB11STF

## (undated) DEVICE — CLIP APPLIER ENDO 05MM MED/LG 176630

## (undated) DEVICE — DRSG BANDAID 3/4X3"

## (undated) DEVICE — SYR 50ML LL W/O NDL 309653

## (undated) DEVICE — SUCTION CANISTER MEDIVAC LINER 3000ML W/LID 65651-530

## (undated) DEVICE — SU VICRYL 0 UR-6 27" J603H

## (undated) DEVICE — PACK LAP CHOLE SLC15LCFSD

## (undated) DEVICE — ENDO POUCH GOLD 10MM ECATCH 173050G

## (undated) DEVICE — ENDO TROCAR FIRST ENTRY KII FIOS Z-THRD 05X100MM CTF03

## (undated) DEVICE — ENDO CANNULA 05MM VERSAONE UNIVERSAL UNVCA5STF

## (undated) RX ORDER — DEXAMETHASONE SODIUM PHOSPHATE 4 MG/ML
INJECTION, SOLUTION INTRA-ARTICULAR; INTRALESIONAL; INTRAMUSCULAR; INTRAVENOUS; SOFT TISSUE
Status: DISPENSED
Start: 2017-09-10

## (undated) RX ORDER — PROPOFOL 10 MG/ML
INJECTION, EMULSION INTRAVENOUS
Status: DISPENSED
Start: 2017-09-10

## (undated) RX ORDER — BUPIVACAINE HYDROCHLORIDE AND EPINEPHRINE 5; 5 MG/ML; UG/ML
INJECTION, SOLUTION EPIDURAL; INTRACAUDAL; PERINEURAL
Status: DISPENSED
Start: 2017-09-10

## (undated) RX ORDER — ONDANSETRON 2 MG/ML
INJECTION INTRAMUSCULAR; INTRAVENOUS
Status: DISPENSED
Start: 2017-09-10

## (undated) RX ORDER — LIDOCAINE HYDROCHLORIDE 20 MG/ML
INJECTION, SOLUTION EPIDURAL; INFILTRATION; INTRACAUDAL; PERINEURAL
Status: DISPENSED
Start: 2017-09-10

## (undated) RX ORDER — FENTANYL CITRATE 50 UG/ML
INJECTION, SOLUTION INTRAMUSCULAR; INTRAVENOUS
Status: DISPENSED
Start: 2017-09-10